# Patient Record
Sex: MALE | Race: BLACK OR AFRICAN AMERICAN | NOT HISPANIC OR LATINO | Employment: FULL TIME | URBAN - METROPOLITAN AREA
[De-identification: names, ages, dates, MRNs, and addresses within clinical notes are randomized per-mention and may not be internally consistent; named-entity substitution may affect disease eponyms.]

---

## 2017-02-08 ENCOUNTER — OFFICE VISIT (OUTPATIENT)
Dept: FAMILY MEDICINE | Facility: CLINIC | Age: 22
End: 2017-02-08

## 2017-02-08 VITALS
DIASTOLIC BLOOD PRESSURE: 73 MMHG | BODY MASS INDEX: 38.49 KG/M2 | HEIGHT: 68 IN | WEIGHT: 254 LBS | HEART RATE: 76 BPM | SYSTOLIC BLOOD PRESSURE: 116 MMHG | TEMPERATURE: 97.5 F
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
## 2017-02-08 NOTE — PROGRESS NOTES
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  He called animal control and they verfieid that the dog was up to date on all shots. He has had some pain on his left thigh where the dog bite was, but no significant erythema, swelling, or drainage. No fevers/chills.    PMH, Medications and Allergies were reviewed and updated as needed.  UTD on tetanus shot.    ROS: No fevers/chills.        OBJECTIVE     Filed Vitals:    02/08/17 1637   BP: 116/73   Pulse: 76   Temp: 97.5  F (36.4  C)   TempSrc: Oral   Height: 5' 7.75\" (172.1 cm)   Weight: 254 lb (115.214 kg)     Body mass index is 38.9 kg/(m^2).    General :  healthy and alert, no distress  Musculoskeletal: Left thigh has a 1cm scab with some surrounding ecchymosis, but no erythema or drainage.  Non-tender. No increased warmth.  Skin:   no other lesions or rashes   Neurological:  normal gait, no gross defects  Psychiatric:  appropriate mood and affect                        ASSESSMENT AND PLAN     (W54.0XXA) Dog bite, initial encounter  (primary encounter diagnosis)   Comment: Left thigh.  No signs of infection on exam.  Dog was up to date on shots per patient verified by animal control.  UTD on tetanus shot as well.  No need for prophylactic abx this far out.  Plan: Provided bacitracin to keep on the area for the next 5 days, return if things worsen    RTC as needed for follow up or sooner if develops new or worsening symptoms.    Discussed with MD Regan Sanabria, DO " PGY2  [facility]

## 2017-02-08 NOTE — PATIENT INSTRUCTIONS
Thank you for coming to clinic today.  Please do not hesitate to call or return if you have any questions.    1) Use bacitracin on the area each time you change a bandaid for the next 5 days  2) Return if you noticed worsening redness or pain in the area    Sincerely,  Dr. Gates

## 2017-02-08 NOTE — PROGRESS NOTES
Preceptor attestation:  Patient seen and discussed with the resident.  Assessment and plan reviewed with resident and agreed upon.  Supervising physician: Gunjan Ortega MD  [facility]

## 2017-02-08 NOTE — MR AVS SNAPSHOT
After Visit Summary   2/8/2017    Winston Vogt    MRN: [id number]           Patient Information     Date Of Birth          1995        Visit Information        Provider Department      2/8/2017 4:30 PM Regan Gates,  [facility]        Today's Diagnoses     Dog bite, initial encounter    -  1       Care Instructions    Thank you for coming to clinic today.  Please do not hesitate to call or return if you have any questions.    1) Use bacitracin on the area each time you change a bandaid for the next 5 days  2) Return if you noticed worsening redness or pain in the area    Sincerely,  Dr. Gates          Follow-ups after your visit        Who to contact     Please call your clinic at [phone] to:    Ask questions about your health    Make or cancel appointments    Discuss your medicines    Learn about your test results    Speak to your doctor   If you have compliments or concerns about an experience at your clinic, or if you wish to file a complaint, please contact [facility] Patient Relations at [phone] or email us at [email or web address]         Additional Information About Your Visit        MyChart Information     [employer] gives you secure access to your electronic health record. If you see a primary care provider, you can also send messages to your care team and make appointments. If you have questions, please call your primary care clinic.  If you do not have a primary care provider, please call [phone] and they will assist you.      [employer] is an electronic gateway that provides easy, online access to your medical records. With [employer], you can request a clinic appointment, read your test results, renew a prescription or communicate with your care team.     To access your existing account, please contact your [facility] or call [phone] for assistance.        Care EveryWhere ID     This is your  "Care EveryWhere ID. This could be used by other organizations to access your [place] medical records  [id number]        Your Vitals Were     Pulse Temperature Height BMI (Body Mass Index)          76 97.5  F (36.4  C) (Oral) 5' 7.75\" (172.1 cm) 38.90 kg/m2         Blood Pressure from Last 3 Encounters:   02/08/17 116/73   10/27/16 118/76   06/08/16 124/82    Weight from Last 3 Encounters:   02/08/17 254 lb (115.214 kg)   10/27/16 251 lb 6.4 oz (114.034 kg)   06/08/16 255 lb 12.8 oz (116.03 kg)              Today, you had the following     No orders found for display       Primary Care Provider Office Phone # Fax #    Shelly Gordon [phone]       [address] [address]        Thank you!     Thank you for choosing [facility]  for your care. Our goal is always to provide you with excellent care. Hearing back from our patients is one way we can continue to improve our services. Please take a few minutes to complete the written survey that you may receive in the mail after your visit with us. Thank you!             Your Updated Medication List - Protect others around you: Learn how to safely use, store and throw away your medicines at www.disposemymeds.org.          This list is accurate as of: 2/8/17  4:50 PM.  Always use your most recent med list.                   Brand Name Dispense Instructions for use    albuterol 108 (90 BASE) MCG/ACT Inhaler    albuterol    1 Inhaler    Inhale 2 puffs into the lungs every 6 hours as needed for shortness of breath / dyspnea or wheezing       fluticasone 110 MCG/ACT Inhaler    FLOVENT HFA    1 Inhaler    Inhale 1 puff into the lungs 2 times daily       pseudoePHEDrine 30 MG tablet    SUDAFED    45 tablet    Take 2 tablets (60 mg) by mouth 3 times daily as needed for congestion         "

## 2017-02-27 ENCOUNTER — TELEPHONE (OUTPATIENT)
Dept: FAMILY MEDICINE | Facility: CLINIC | Age: 22
End: 2017-02-27

## 2017-03-01 NOTE — TELEPHONE ENCOUNTER
Refill denied.  Needs visit.  Oral ketoconazole is no longer prescribed due to concerns for liver problems.  Shelly Gordon MD

## 2017-03-08 DIAGNOSIS — J45.909 UNCOMPLICATED ASTHMA, UNSPECIFIED ASTHMA SEVERITY: ICD-10-CM

## 2017-03-08 RX ORDER — FLUTICASONE PROPIONATE 110 UG/1
1 AEROSOL, METERED RESPIRATORY (INHALATION) 2 TIMES DAILY
Qty: 1 INHALER | Refills: 0 | Status: SHIPPED | OUTPATIENT
Start: 2017-03-08 | End: 2017-04-12

## 2017-03-08 NOTE — TELEPHONE ENCOUNTER
ACT=12.   Flovent sent.   Advised to be seen in clinic to discuss asthma, within 3 days.   Offered to transfer call to appt line, pt declined. Said he will call back later.  /DIANA Nagel

## 2017-03-09 ASSESSMENT — ASTHMA QUESTIONNAIRES: ACT_TOTALSCORE: 12

## 2017-03-21 ENCOUNTER — TELEPHONE (OUTPATIENT)
Dept: FAMILY MEDICINE | Facility: CLINIC | Age: 22
End: 2017-03-21

## 2017-03-21 NOTE — TELEPHONE ENCOUNTER
Pt's pharmacy is requesting a refill of Ketoconazole 200mg tablets, however it is no longer on the pt's med list.  Please advise.

## 2017-03-22 ENCOUNTER — TELEPHONE (OUTPATIENT)
Dept: FAMILY MEDICINE | Facility: CLINIC | Age: 22
End: 2017-03-22

## 2017-03-22 NOTE — TELEPHONE ENCOUNTER
See 2/27/17 documentation.  Already denied. Call and tell pharmacy please.  Shelly Gordon MD  Routed to [facility].

## 2017-03-23 NOTE — TELEPHONE ENCOUNTER
Contacted pt's pharmacy and gave them the message that the medication was stopped due to liver concerns.  They verbalized understanding and double checked that the pt was notified.  I assured the pharmacist that the pt was notified and she indicated the medication was declined in his chart.   Maegan Luna, [facility]

## 2017-04-12 DIAGNOSIS — J45.909 UNCOMPLICATED ASTHMA, UNSPECIFIED ASTHMA SEVERITY: ICD-10-CM

## 2017-04-12 RX ORDER — FLUTICASONE PROPIONATE 110 UG/1
1 AEROSOL, METERED RESPIRATORY (INHALATION) 2 TIMES DAILY
Qty: 1 INHALER | Refills: 11 | Status: SHIPPED | OUTPATIENT
Start: 2017-04-12 | End: 2017-05-09 | Stop reason: ALTCHOICE

## 2017-05-09 ENCOUNTER — OFFICE VISIT (OUTPATIENT)
Dept: FAMILY MEDICINE | Facility: CLINIC | Age: 22
End: 2017-05-09

## 2017-05-09 VITALS
TEMPERATURE: 98 F | DIASTOLIC BLOOD PRESSURE: 74 MMHG | WEIGHT: 257.8 LBS | HEART RATE: 75 BPM | HEIGHT: 68 IN | SYSTOLIC BLOOD PRESSURE: 115 MMHG | BODY MASS INDEX: 39.07 KG/M2

## 2017-05-09 DIAGNOSIS — J45.20 INTERMITTENT ASTHMA, UNCOMPLICATED: ICD-10-CM

## 2017-05-09 DIAGNOSIS — B35.4 TINEA CORPORIS: Primary | ICD-10-CM

## 2017-05-09 LAB
ALBUMIN SERPL BCP-MCNC: 3.9 G/DL (ref 3.5–5)
ALP SERPL-CCNC: 133 U/L (ref 45–120)
ALT SERPL W/O P-5'-P-CCNC: 36 U/L (ref 0–45)
ANION GAP SERPL CALCULATED.3IONS-SCNC: 12 MMOL/L (ref 5–18)
AST SERPL-CCNC: 23 U/L (ref 0–40)
BILIRUB SERPL-MCNC: 0.7 MG/DL (ref 0–1)
BUN SERPL-MCNC: 11 MG/DL (ref 8–22)
CALCIUM SERPL-MCNC: 9.5 MG/DL (ref 8.5–10.5)
CHLORIDE SERPL-SCNC: 105 MMOL/L (ref 98–107)
CO2 SERPL-SCNC: 22 MMOL/L (ref 22–31)
CREAT SERPL-MCNC: 0.84 MG/DL (ref 0.7–1.3)
GLUCOSE SERPL-MCNC: 110 MG/DL (ref 70–125)
HBA1C MFR BLD: 5.7 % (ref 4.1–5.7)
POTASSIUM SERPL-SCNC: 4 MMOL/L (ref 3.5–5)
PROT SERPL-MCNC: 7.4 G/DL (ref 6–8)
SODIUM SERPL-SCNC: 139 MMOL/L (ref 136–145)

## 2017-05-09 RX ORDER — ALBUTEROL SULFATE 90 UG/1
2 AEROSOL, METERED RESPIRATORY (INHALATION) EVERY 6 HOURS PRN
Qty: 1 INHALER | Refills: 6 | Status: SHIPPED | OUTPATIENT
Start: 2017-05-09 | End: 2017-08-08

## 2017-05-09 RX ORDER — TERBINAFINE HYDROCHLORIDE 250 MG/1
250 TABLET ORAL DAILY
Qty: 30 TABLET | Refills: 0 | Status: SHIPPED | OUTPATIENT
Start: 2017-05-09 | End: 2017-06-08

## 2017-05-09 NOTE — PROGRESS NOTES
Preceptor attestation:  Patient seen and discussed with the resident. Assessment and plan reviewed with resident and agreed upon.  Supervising physician: João Clark MD  [facility]

## 2017-05-09 NOTE — MR AVS SNAPSHOT
After Visit Summary   5/9/2017    Winston Vogt    MRN: [id number]           Patient Information     Date Of Birth          1995        Visit Information        Provider Department      5/9/2017 11:20 AM Myles Cowart MD [facility]        Today's Diagnoses     Tinea corporis    -  1    Intermittent asthma, uncomplicated [J45.20]          Care Instructions    Follow-up in 1 month . Take medication once a day to treat the fungal infection.     Bring a change of clothes to work in case your clothes gets damp.   Take Daily showers.   Wear loose fit clothing to prevent sweating.         Follow-ups after your visit        Who to contact     Please call your clinic at [phone] to:    Ask questions about your health    Make or cancel appointments    Discuss your medicines    Learn about your test results    Speak to your doctor   If you have compliments or concerns about an experience at your clinic, or if you wish to file a complaint, please contact [facility] Patient Relations at [phone] or email us at [email or web address]         Additional Information About Your Visit        MyChart Information     [employer] gives you secure access to your electronic health record. If you see a primary care provider, you can also send messages to your care team and make appointments. If you have questions, please call your primary care clinic.  If you do not have a primary care provider, please call [phone] and they will assist you.      [employer] is an electronic gateway that provides easy, online access to your medical records. With [employer], you can request a clinic appointment, read your test results, renew a prescription or communicate with your care team.     To access your existing account, please contact your [facility] or call [phone] for assistance.        Care EveryWhere ID     This is your Care EveryWhere ID. This  "could be used by other organizations to access your [place] medical records  [id number]        Your Vitals Were     Pulse Temperature Height BMI (Body Mass Index)          75 98  F (36.7  C) 5' 8\" (172.7 cm) 39.2 kg/m2         Blood Pressure from Last 3 Encounters:   05/09/17 115/74   02/08/17 116/73   10/27/16 118/76    Weight from Last 3 Encounters:   05/09/17 257 lb 12.8 oz (116.9 kg)   02/08/17 254 lb (115.2 kg)   10/27/16 251 lb 6.4 oz (114 kg)              We Performed the Following     Comprehensive Metabolic ([facility])     Hemoglobin A1c ([facility])          Today's Medication Changes          These changes are accurate as of: 5/9/17 12:10 PM.  If you have any questions, ask your nurse or doctor.               Start taking these medicines.        Dose/Directions    terbinafine 250 MG tablet   Commonly known as:  lamISIL   Used for:  Tinea corporis   Started by:  Myles Cowart MD        Dose:  250 mg   Take 1 tablet (250 mg) by mouth daily   Quantity:  30 tablet   Refills:  0         Stop taking these medicines if you haven't already. Please contact your care team if you have questions.     fluticasone 110 MCG/ACT Inhaler   Commonly known as:  FLOVENT HFA   Stopped by:  Myles Cowart MD                Where to get your medicines      These medications were sent to [facility]/pharmacy #[id number] - [place], [place] - [address]  [address], [place] [address]    Hours:  24-hours Phone:  [phone]     albuterol 108 (90 BASE) MCG/ACT Inhaler    terbinafine 250 MG tablet                Primary Care Provider Office Phone # Fax #    Shelly Gordon [phone]       [address] [address]        Thank you!     Thank you for choosing [facility]  for your care. Our goal is always to provide you with excellent care. Hearing back from our patients is one way we can continue to improve our services. Please take a few minutes to complete " the written survey that you may receive in the mail after your visit with us. Thank you!             Your Updated Medication List - Protect others around you: Learn how to safely use, store and throw away your medicines at www.disposemymeds.org.          This list is accurate as of: 5/9/17 12:10 PM.  Always use your most recent med list.                   Brand Name Dispense Instructions for use    albuterol 108 (90 BASE) MCG/ACT Inhaler    albuterol    1 Inhaler    Inhale 2 puffs into the lungs every 6 hours as needed for shortness of breath / dyspnea or wheezing       pseudoePHEDrine 30 MG tablet    SUDAFED    45 tablet    Take 2 tablets (60 mg) by mouth 3 times daily as needed for congestion       terbinafine 250 MG tablet    lamISIL    30 tablet    Take 1 tablet (250 mg) by mouth daily

## 2017-05-09 NOTE — LETTER
May 12, 2017      Winston Vogt  [address] [place] [address]        Dear Winston,    Please see below for your test results.  Diabetes screen was normal.     Resulted Orders   Hemoglobin A1c ([facility])   Result Value Ref Range    Hemoglobin A1C 5.7 4.1 - 5.7 %   Comprehensive Metabolic ([facility])   Result Value Ref Range    Sodium 139 136 - 145 mmol/L    Potassium 4.0 3.5 - 5.0 mmol/L    Chloride 105 98 - 107 mmol/L    CO2, Total 22 22 - 31 mmol/L    Anion Gap 12 5 - 18 mmol/L    Glucose 110 70 - 125 mg/dL    Urea Nitrogen 11 8 - 22 mg/dL    Creatinine 0.84 0.70 - 1.30 mg/dL    GFR Estimate If Black >60 >60 mL/min/1.73m2    GFR Estimate >60 >60 mL/min/1.73m2    Bilirubin Total 0.7 0.0 - 1.0 mg/dL    Calcium 9.5 8.5 - 10.5 mg/dL    Protein Total 7.4 6.0 - 8.0 g/dL    Albumin 3.9 3.5 - 5.0 g/dL    Alkaline Phosphatase 133 (H) 45 - 120 U/L    AST (SGOT) 23 0 - 40 U/L    ALT (SGPT) 36 0 - 45 U/L    Narrative    Test performed by:  [facility] [address] [place] [address]  Fasting Glucose reference range is 70-99 mg/dL per  American Diabetes Association (ADA) guidelines.       If you have any questions, please call the clinic to make an appointment.    Sincerely,    Myles Cowart MD

## 2017-05-09 NOTE — LETTER
My Asthma Action Plan  Name: Winston Vogt   YOB: 1995  Date: 5/9/2017   My doctor: Myles Cowart MD   My clinic: [facility]        My Control Medicine: none  My Rescue Medicine: Albuterol 2 puffs    My Asthma Severity: intermittent  Avoid your asthma triggers: Patient is unaware of triggers               GREEN ZONE     Good Control    I feel good    No cough or wheeze    Can work, sleep and play without asthma symptoms       Take your asthma control medicine every day.     1. If exercise triggers your asthma, take your rescue medication    15 minutes before exercise or sports, and    During exercise if you have asthma symptoms  2. Spacer to use with inhaler: If you have a spacer, make sure to use it with your inhaler             YELLOW ZONE     Getting Worse  I have ANY of these:    I do not feel good    Cough or wheeze    Chest feels tight    Wake up at night   1. Keep taking your Green Zone medications  2. Start taking your rescue medicine:    every 20 minutes for up to 1 hour. Then every 4 hours for 24-48 hours.  3. If you stay in the Yellow Zone for more than 12-24 hours, contact your doctor.  4. If you do not return to the Green Zone in 12-24 hours or you get worse, start taking your oral steroid medicine if prescribed by your provider.           RED ZONE     Medical Alert - Get Help  I have ANY of these:    I feel awful    Medicine is not helping    Breathing getting harder    Trouble walking or talking    Nose opens wide to breathe       1. Take your rescue medicine NOW  2. If your provider has prescribed an oral steroid medicine, start taking it NOW  3. Call your doctor NOW  4. If you are still in the Red Zone after 20 minutes and you have not reached your doctor:    Take your rescue medicine again and    Call 911 or go to the emergency room right away    See your regular doctor within 2 weeks of an Emergency Room or Urgent Care visit for follow-up treatment.        Electronically  signed by: Myles Cowart, May 9, 2017    Annual Reminders:  Meet with Asthma Educator,  Flu Shot in the Fall, consider Pneumonia Vaccination for patients with asthma (aged 19 and older).    Pharmacy: [facility]/PHARMACY #[id number] - [place], [place] - [address]                    Asthma Triggers  How To Control Things That Make Your Asthma Worse    Triggers are things that make your asthma worse.  Look at the list below to help you find your triggers and what you can do about them.  You can help prevent asthma flare-ups by staying away from your triggers.      Trigger                                                          What you can do   Cigarette Smoke  Tobacco smoke can make asthma worse. Do not allow smoking in your home, car or around you.  Be sure no one smokes at a child s day care or school.  If you smoke, ask your health care provider for ways to help you quit.  Ask family members to quit too.  Ask your health care provider for a referral to Quit Plan to help you quit smoking, or call 0-103-108-PLAN.     Colds, Flu, Bronchitis  These are common triggers of asthma. Wash your hands often.  Don t touch your eyes, nose or mouth.  Get a flu shot every year.     Dust Mites  These are tiny bugs that live in cloth or carpet. They are too small to see. Wash sheets and blankets in hot water every week.   Encase pillows and mattress in dust mite proof covers.  Avoid having carpet if you can. If you have carpet, vacuum weekly.   Use a dust mask and HEPA vacuum.   Pollen and Outdoor Mold  Some people are allergic to trees, grass, or weed pollen, or molds. Try to keep your windows closed.  Limit time out doors when pollen count is high.   Ask you health care provider about taking medicine during allergy season.     Animal Dander  Some people are allergic to skin flakes, urine or saliva from pets with fur or feathers. Keep pets with fur or feathers out of your home.    If you can t keep the pet outdoors, then keep  the pet out of your bedroom.  Keep the bedroom door closed.  Keep pets off cloth furniture and away from stuffed toys.     Mice, Rats, and Cockroaches  Some people are allergic to the waste from these pests.   Cover food and garbage.  Clean up spills and food crumbs.  Store grease in the refrigerator.   Keep food out of the bedroom.   Indoor Mold  This can be a trigger if your home has high moisture. Fix leaking faucets, pipes, or other sources of water.   Clean moldy surfaces.  Dehumidify basement if it is damp and smelly.   Smoke, Strong Odors, and Sprays  These can reduce air quality. Stay away from strong odors and sprays, such as perfume, powder, hair spray, paints, smoke incense, paint, cleaning products, candles and new carpet.   Exercise or Sports  Some people with asthma have this trigger. Be active!  Ask your doctor about taking medicine before sports or exercise to prevent symptoms.    Warm up for 5-10 minutes before and after sports or exercise.     Other Triggers of Asthma  Cold air:  Cover your nose and mouth with a scarf.  Sometimes laughing or crying can be a trigger.  Some medicines and food can trigger asthma.

## 2017-05-09 NOTE — PROGRESS NOTES
"      HPI:       Winston Vogt is a 22 year old who presents for the following  Patient presents with:  Asthma: followup  Eczema: recheck, came back    #Asthma  Most recent AAP done in 06/2016. At his visit approximately 11 months ago patient endorsed that he had difficulty with compliance of using inhaled fluticasone HFA daily. At that time he was using the inhaler approximately 2-3x/week, a lot of which he attributed to exercise. ACT score was 21 at that time.   Today,     For exercise: he doesn't do heavy strenous stuff. Cycling on the occasion for activity. Doesn't get short of breath like he used to as he would previously take inhaler before exercise.     ACT Total Scores 10/27/2016 3/8/2017 5/9/2017   ACT TOTAL SCORE - - -   ASTHMA ER VISITS - - -   ASTHMA HOSPITALIZATIONS - - -   ACT TOTAL SCORE (Goal Greater than or Equal to 20) 18 12 22   In the past 12 months, how many times did you visit the emergency room for your asthma without being admitted to the hospital? 0 0 0   In the past 12 months, how many times were you hospitalized overnight because of your asthma? 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 Took oral pill in past which mostly resolved infection, but it returned several months later. Rash is Currently affecting front and side of necks, chest, and some lower back on flank. Non-itchy in nature.     #Vaccination  Defers HPV testing today as he would like to discuss with those close to him regarding it.     Problem, Medication and Allergy Lists were reviewed and are current.  Patient is an established patient of this clinic.         Review of Systems:   Review of SystemsAs above per HPI          Physical Exam:     Patient Vitals for the past 24 hrs:   BP Temp Pulse Height Weight   05/09/17 1135 115/74 98  F (36.7  C) 75 5' 8\" (172.7 cm) 257 lb 12.8 oz (116.9 kg)     Body mass index is 39.2 kg/(m^2).  Vitals were reviewed and were normal     Physical " Exam  PULM: clear bilaterally without wheezes or rhonchi  SKIN: fungal infection that is non-pruritic of the lateral and anterior neck, upper chest, and in flank bilaterally  GEN: NAD, AAox3, appears stated age  CV: RRR no m/r/g, s1 and s2 noted  HEENT: EOMI, head normocephalic, sclera anicteric, trachea midline  ABD: soft, non-tender, + BS in all quadrants  NEURO: GCS 15  GAIT: normal  PSYCH: euthymic, linear thoughts, no delusions/hallucinations, comprehensible        Results:   A1c and CMP pending  Assessment and Plan     1. Intermittent asthma, uncomplicated [J45.20]  Discontinued flovent inhaler  Asthma Action plan given  - albuterol (ALBUTEROL) 108 (90 BASE) MCG/ACT Inhaler; Inhale 2 puffs into the lungs every 6 hours as needed for[   shortness of breath / dyspnea or wheezing  Dispense: 1 Inhaler; Refill: 6    2. Tinea corporis  30 day trial of oral terbinafine, follow-up in 1 month and consider extending therapy or increasing dose.   Recommended loose fit clothing, change of clothes if they get damp while at work. Daily showers.   - terbinafine (LAMISIL) 250 MG tablet; Take 1 tablet (250 mg) by mouth daily  Dispense: 30 tablet; Refill: 0  - Hemoglobin A1c ([facility])  - Comprehensive Metabolic ([facility])  There are no discontinued medications.  Options for treatment and follow-up care were reviewed with the patient. Winston Vogt  engaged in the decision making process and verbalized understanding of the options discussed and agreed with the final plan.    F/U appt: tinea corporis, offer HPV shot #1      Patient was precepted with Dr. Clark who agrees with the above assessment and plan    Myles Cowart MD PGY2  [facility]  [phone]

## 2017-05-09 NOTE — PATIENT INSTRUCTIONS
Follow-up in 1 month . Take medication once a day to treat the fungal infection.     Bring a change of clothes to work in case your clothes gets damp.   Take Daily showers.   Wear loose fit clothing to prevent sweating.

## 2017-05-10 ASSESSMENT — ASTHMA QUESTIONNAIRES: ACT_TOTALSCORE: 22

## 2017-08-08 DIAGNOSIS — J45.20 INTERMITTENT ASTHMA, UNCOMPLICATED: ICD-10-CM

## 2017-08-08 RX ORDER — ALBUTEROL SULFATE 90 UG/1
2 AEROSOL, METERED RESPIRATORY (INHALATION) EVERY 6 HOURS PRN
Qty: 1 INHALER | Refills: 12 | Status: SHIPPED | OUTPATIENT
Start: 2017-08-08 | End: 2017-10-02

## 2017-10-02 ENCOUNTER — OFFICE VISIT (OUTPATIENT)
Dept: FAMILY MEDICINE | Facility: CLINIC | Age: 22
End: 2017-10-02

## 2017-10-02 VITALS
OXYGEN SATURATION: 96 % | DIASTOLIC BLOOD PRESSURE: 70 MMHG | TEMPERATURE: 98.4 F | WEIGHT: 254.6 LBS | HEIGHT: 68 IN | HEART RATE: 73 BPM | SYSTOLIC BLOOD PRESSURE: 105 MMHG | BODY MASS INDEX: 38.58 KG/M2

## 2017-10-02 DIAGNOSIS — H72.91 OTITIS MEDIA OF RIGHT EAR WITH RUPTURE OF TYMPANIC MEMBRANE: ICD-10-CM

## 2017-10-02 DIAGNOSIS — B35.4 TINEA CORPORIS: ICD-10-CM

## 2017-10-02 DIAGNOSIS — Z23 NEED FOR VACCINATION: ICD-10-CM

## 2017-10-02 DIAGNOSIS — H66.91 OTITIS MEDIA OF RIGHT EAR WITH RUPTURE OF TYMPANIC MEMBRANE: ICD-10-CM

## 2017-10-02 DIAGNOSIS — J45.20 INTERMITTENT ASTHMA, UNCOMPLICATED: ICD-10-CM

## 2017-10-02 DIAGNOSIS — J45.30 MILD PERSISTENT ASTHMA WITHOUT COMPLICATION: Primary | ICD-10-CM

## 2017-10-02 RX ORDER — TERBINAFINE HYDROCHLORIDE 250 MG/1
250 TABLET ORAL DAILY
Qty: 30 TABLET | Refills: 0 | Status: SHIPPED | OUTPATIENT
Start: 2017-10-02 | End: 2017-11-01

## 2017-10-02 RX ORDER — FLUTICASONE PROPIONATE 44 UG/1
2 AEROSOL, METERED RESPIRATORY (INHALATION) 2 TIMES DAILY
Qty: 1 INHALER | Refills: 1 | Status: SHIPPED | OUTPATIENT
Start: 2017-10-02 | End: 2017-10-03

## 2017-10-02 RX ORDER — ALBUTEROL SULFATE 90 UG/1
2 AEROSOL, METERED RESPIRATORY (INHALATION) EVERY 6 HOURS PRN
Qty: 1 INHALER | Refills: 12 | Status: SHIPPED | OUTPATIENT
Start: 2017-10-02

## 2017-10-02 RX ORDER — OFLOXACIN 3 MG/ML
10 SOLUTION AURICULAR (OTIC) 2 TIMES DAILY
Qty: 14 ML | Refills: 0 | Status: SHIPPED | OUTPATIENT
Start: 2017-10-02 | End: 2017-10-16

## 2017-10-02 NOTE — NURSING NOTE
Winston Vogt      1.  Has the patient received the information for the influenza vaccine? YES    2.  Does the patient have any of the following contraindications?     Allergy to eggs?  No     Allergic reaction to previous influenza vaccines?  No     Any other problems to previous influenza vaccines?  No     Paralyzed by Guillain-[place] syndrome?  No     Currently pregnant? NO     Current moderate or severe illness?  No    Vaccination given by Rebecca Zhao MA.  Recorded by Rebecca Zhao

## 2017-10-02 NOTE — PROGRESS NOTES
"[facility] Clinic Visit    Subjective:  Winston Vogt is a 22 year old male with a PMHx significant for asthma, strabismus and tinea corporis who presents for follow up.     # Asthma: After a URI about 5 weeks ago, he reports his \"asthma got a lot worse\" for a subsequent 3 weeks. Around that time, he also cleaned out a very oneil room which is a known trigger for his asthma symptoms. He endorsed trouble breathing, \"non-stop\" wheezing and chest tightness since he wasn't able to to take a full deep breath. He used Albuterol every 3 hours over those 3 weeks. His ACT score is 13 today, because of those symptoms he had a couple weeks ago, but he has been largely asymptomatic for the past 2 weeks. Now, he reports trouble breathing for a couple minutes every morning and evening that resolves spontaneously. No wheezing or cough. He hasn't needed Albuterol since 3 weeks ago.     Of note, he was prescribed daily inhaled Fluticasone HFA last year but has struggled with daily adherence. His last dose was in July 2017. He reports it doesn't help his symptoms as much as the Albuterol so he chooses not to take it.     He has never gone to the ED/urgent care for an asthma attack. Never smoked tobacco or anything else. Never used alcohol or other illicit drugs. He has had minor reflux in the past but not for awhile. He doesn't snore nor have excess daytime sleepiness. He has seasonal allergies that presents with a runny nose which he doesn't have now.     # Tinea corporis, chronic: He continues to have a non-itchy, white, scaly and diffuse rash covering his neck, chest, epigastric area, medial side of his left arm and back. In the past, he took an oral pill which mostly resolved his rash but it recurred several months later. He was prescribed Terbinafine for one month in May 2017, but only took it infrequently for about 10 total days. He reports the rash is asymptomatic and has been stable in areas affected for awhile. " "He tried using OTC eczema cream which didn't improve his rash.     # Right ear pain and discharge: About one month ago, he felt a sharp, sudden pain in his right ear for about 30 seconds that spontaneously resolved. He doesn't recall any precipitating or related event. He was not inserting anything into his ear at the time. Since then he has felt like his ear is clogged or leaking, and is frequently cleaning it with q-tips. He occasionally sees green-mercedez tinged fluid though it's inconsistent. No fevers, chills or current pain or discharge. No loss of hearing.      Preventative care: Agreeable to flu shot today.    ROS:   Gen: No fevers, chills. Weight loss of about 6 lbs (intentional)   HEENT: No headache, vision changes, hearing loss. R lazy eye/strabismus stable  CV: No chest pain, palpitations, peripheral edema  Resp: No SOB, cough, wheezing, congestion, coryza  Skin: Rash stable    Objective:  Vitals:    10/02/17 1050   BP: 105/70   Pulse: 73   Temp: 98.4  F (36.9  C)   TempSrc: Oral   SpO2: 96%   Weight: 254 lb 9.6 oz (115.5 kg)   Height: 5' 7.5\" (171.5 cm)     Body mass index is 39.29 kg/(m^2).    GEN: NAD, healthy, alert  EYES: strabismus noted, EOMI, normal conjunctivae/sclerae  HENT: perforated right TM and spotty erythema on TM and ear canal without discharge or fluid; normal ear canal/TM on left, nose & mouth w/o ulcers or lesions, clear oropharynx, MMM  NECK: no LAD, asymmetry, masses, scars; thyroid normal to palpation  RESP: CTAB, no w/r/r  CV: RRR, nl S1/S2, no S3/S4, no m/r/g, no peripheral edema, peripheral pulses strong  MSK: no MSK defects noted, gait is age appropriate w/o ataxia  SKIN: diffuse, white, scaly, non-erythematous, flat, ring-shaped lesions circumferentially around neck, over entire chest and epigastric abdominal area, and medial side of left arm; patient reports rash extends into scalp but couldn't observe because of thick hair  PSYCH: mentation appears normal, affect " normal/bright    No results found for this or any previous visit (from the past 24 hour(s)).    Assessment/Plan:  Winston was seen today for derm problem, asthma and ear problem.    Diagnoses and all orders for this visit:    Mild persistent asthma without complication. His moderate-persistent symptoms after a viral URI about a month ago are consistent with an asthma exacerbation. He was only taking Albuterol frequently. We discussed the importance of adhering to the next step up in his asthma control plan which is the daily Flovent. He taught back the significance. Recommended follow up in 2 weeks or sooner if symptoms persist or worsen.  -     fluticasone (FLOVENT HFA) 44 MCG/ACT Inhaler; Inhale 2 puffs into the lungs 2 times daily  -     albuterol (PROAIR HFA) 108 (90 BASE) MCG/ACT Inhaler; Inhale 2 puffs into the lungs every 6 hours as needed for shortness of breath / dyspnea or wheezing    Otitis media of right ear with rupture of tympanic membrane. His ear pain and reports of green discharge with a clearly perforated right TM on exam are concerning for a otitis media. Prescribed Floxin to use, and to avoid significant water entry into ear canal. Will continue to monitor healing of TM for the next few months.   -     ofloxacin (FLOXIN) 0.3 % otic solution; Place 10 drops into the right ear 2 times daily for 14 days    Tinea corporis. Appears stable. Patient did not take Terbinafine consistently or long enough in May so we will try another month course again. Patient voiced understanding of adhering to regimen. Recommended clean hygiene habits, loose clothing and against using topical steroids since it could make it worse.   -     terbinafine (LAMISIL) 250 MG tablet; Take 1 tablet (250 mg) by mouth daily    Need for vaccination  -     ADMIN VACCINE, INITIAL  -     Flu vaccine, quad, with preservative, 0.5 ml    Options for treatment and follow-up care were reviewed with the patient who was engaged and actively  involved in the decision making process, verbalized understanding of the options discussed, and satisfied with the final plan.    Patient was staffed with supervising physician, Dr. Rendon.     Antelmo Harmon MD, PGY-1  [facility]

## 2017-10-02 NOTE — MR AVS SNAPSHOT
After Visit Summary   10/2/2017    Winston Vogt    MRN: [id number]           Patient Information     Date Of Birth          1995        Visit Information        Provider Department      10/2/2017 10:40 AM Antelmo Harmon MD [facility]        Today's Diagnoses     Preventative health care    -  1    Mild persistent asthma without complication        Intermittent asthma, uncomplicated [J45.20]        Otitis media of right ear with rupture of tympanic membrane        Tinea corporis          Care Instructions    - Take Albuterol as needed every 6 hours   - Take Fluticasone 2 puffs, twice a day, every day  - Take 10 antibiotic ear drops into right ear twice a day for 14 days  - Take Terbinafine 1 tablet daily for 30 days for skin rash          Follow-ups after your visit        Who to contact     Please call your clinic at [phone] to:    Ask questions about your health    Make or cancel appointments    Discuss your medicines    Learn about your test results    Speak to your doctor   If you have compliments or concerns about an experience at your clinic, or if you wish to file a complaint, please contact [facility] Patient Relations at [phone] or email us at [email or web address]         Additional Information About Your Visit        MyChart Information     SHERPANDIPITY gives you secure access to your electronic health record. If you see a primary care provider, you can also send messages to your care team and make appointments. If you have questions, please call your primary care clinic.  If you do not have a primary care provider, please call [phone] and they will assist you.      SHERPANDIPITY is an electronic gateway that provides easy, online access to your medical records. With SHERPANDIPITY, you can request a clinic appointment, read your test results, renew a prescription or communicate with your care team.     To access your existing account, please contact  "your [facility] or call [phone] for assistance.        Care EveryWhere ID     This is your Care EveryWhere ID. This could be used by other organizations to access your [place] medical records  [id number]        Your Vitals Were     Pulse Temperature Height Pulse Oximetry BMI (Body Mass Index)       73 98.4  F (36.9  C) (Oral) 5' 7.5\" (171.5 cm) 96% 39.29 kg/m2        Blood Pressure from Last 3 Encounters:   10/02/17 105/70   05/09/17 115/74   02/08/17 116/73    Weight from Last 3 Encounters:   10/02/17 254 lb 9.6 oz (115.5 kg)   05/09/17 257 lb 12.8 oz (116.9 kg)   02/08/17 254 lb (115.2 kg)              We Performed the Following     FLU VAC, SPLIT VIRUS IM > 3 YO (QUADRIVALENT) 33834          Today's Medication Changes          These changes are accurate as of: 10/2/17 11:50 AM.  If you have any questions, ask your nurse or doctor.               Start taking these medicines.        Dose/Directions    fluticasone 44 MCG/ACT Inhaler   Commonly known as:  FLOVENT HFA   Used for:  Mild persistent asthma without complication   Started by:  Antelmo Harmon MD        Dose:  2 puff   Inhale 2 puffs into the lungs 2 times daily   Quantity:  1 Inhaler   Refills:  1       ofloxacin 0.3 % otic solution   Commonly known as:  FLOXIN   Used for:  Otitis media of right ear with rupture of tympanic membrane   Started by:  Antelmo Harmon MD        Dose:  10 drop   Place 10 drops into the right ear 2 times daily for 14 days   Quantity:  14 mL   Refills:  0       terbinafine 250 MG tablet   Commonly known as:  lamISIL   Used for:  Tinea corporis   Started by:  Antelmo Harmon MD        Dose:  250 mg   Take 1 tablet (250 mg) by mouth daily   Quantity:  30 tablet   Refills:  0            Where to get your medicines      These medications were sent to [id number] - [place], [place] - [address] AT [facility] [address]  [address], [place] [address]     Phone:  [phone]     " albuterol 108 (90 BASE) MCG/ACT Inhaler    fluticasone 44 MCG/ACT Inhaler    ofloxacin 0.3 % otic solution    terbinafine 250 MG tablet                Primary Care Provider Office Phone # Fax #    Shelly Gordon [phone]       [address] [address]        Equal Access to Services     [facility]: Hadii aad ku hadasho Soomaali, waaxda luqadaha, qaybta kaalmada adeegyada, waxay idiin hayaan adeeg kharash laMigdaliaaan . So [facility] [phone].    ATENCIÓN: Si habla español, tiene a king disposición servicios gratuitos de asistencia lingüística. Llame al [phone].    We comply with applicable federal civil rights laws and [place] laws. We do not discriminate on the basis of race, color, national origin, age, disability, sex, sexual orientation, or gender identity.            Thank you!     Thank you for choosing [facility]  for your care. Our goal is always to provide you with excellent care. Hearing back from our patients is one way we can continue to improve our services. Please take a few minutes to complete the written survey that you may receive in the mail after your visit with us. Thank you!             Your Updated Medication List - Protect others around you: Learn how to safely use, store and throw away your medicines at www.disposemymeds.org.          This list is accurate as of: 10/2/17 11:50 AM.  Always use your most recent med list.                   Brand Name Dispense Instructions for use Diagnosis    albuterol 108 (90 BASE) MCG/ACT Inhaler    PROAIR HFA    1 Inhaler    Inhale 2 puffs into the lungs every 6 hours as needed for shortness of breath / dyspnea or wheezing    Intermittent asthma, uncomplicated       fluticasone 44 MCG/ACT Inhaler    FLOVENT HFA    1 Inhaler    Inhale 2 puffs into the lungs 2 times daily    Mild persistent asthma without complication       ofloxacin 0.3 % otic solution    FLOXIN    14 mL    Place 10 drops into the right  ear 2 times daily for 14 days    Otitis media of right ear with rupture of tympanic membrane       terbinafine 250 MG tablet    lamISIL    30 tablet    Take 1 tablet (250 mg) by mouth daily    Tinea corporis

## 2017-10-02 NOTE — PATIENT INSTRUCTIONS
- Take Albuterol as needed every 6 hours   - Take Fluticasone 2 puffs, twice a day, every day  - Take 10 antibiotic ear drops into right ear twice a day for 14 days  - Take Terbinafine 1 tablet daily for 30 days for skin rash

## 2017-10-02 NOTE — PROGRESS NOTES
Preceptor attestation:  Patient seen and discussed with the resident. Assessment and plan reviewed with resident and agreed upon.  Supervising physician: Celso Rendon  [facility]

## 2017-10-03 DIAGNOSIS — J45.30 MILD PERSISTENT ASTHMA WITHOUT COMPLICATION: Primary | ICD-10-CM

## 2017-10-03 NOTE — PROGRESS NOTES
Pharmacy Note    Received fax that Flovent HFA was no longer covered.  Called insurance & found out that Arnuity Ellipta was preferred/covered.  Sent Rx for Arnuity Ellipta 100mcg 1 puff BID with note to pharmacy to educate pt that this is new controller, and dosing change, and to educate on how to use the new device.    Joselyn Cabrera, Pharm.D.

## 2017-12-06 ENCOUNTER — TELEPHONE (OUTPATIENT)
Dept: FAMILY MEDICINE | Facility: CLINIC | Age: 22
End: 2017-12-06

## 2017-12-06 DIAGNOSIS — J45.30 MILD PERSISTENT ASTHMA WITHOUT COMPLICATION: ICD-10-CM

## 2017-12-06 NOTE — TELEPHONE ENCOUNTER
Pt insurance changed recently per [facility], Arnuity Ellipta is no longer covered. Flovent is covered. An existing rx is on file and pharmacist will use that and call pt to .  Routed to Dr. Gordon PCP, [facility]-/Shona,RN

## 2017-12-08 RX ORDER — FLUTICASONE PROPIONATE 44 UG/1
2 AEROSOL, METERED RESPIRATORY (INHALATION) 2 TIMES DAILY
Qty: 1 INHALER | Refills: 11 | Status: SHIPPED | OUTPATIENT
Start: 2017-12-08 | End: 2018-09-06

## 2017-12-12 ENCOUNTER — OFFICE VISIT (OUTPATIENT)
Dept: FAMILY MEDICINE | Facility: CLINIC | Age: 22
End: 2017-12-12

## 2017-12-12 VITALS
WEIGHT: 253 LBS | SYSTOLIC BLOOD PRESSURE: 122 MMHG | BODY MASS INDEX: 39.04 KG/M2 | DIASTOLIC BLOOD PRESSURE: 83 MMHG | HEART RATE: 90 BPM | TEMPERATURE: 98 F

## 2017-12-12 DIAGNOSIS — J45.30 MILD PERSISTENT ASTHMA WITHOUT COMPLICATION: ICD-10-CM

## 2017-12-12 DIAGNOSIS — B36.0 TINEA VERSICOLOR: Primary | ICD-10-CM

## 2017-12-12 RX ORDER — FLUCONAZOLE 150 MG/1
300 TABLET ORAL WEEKLY
Qty: 4 TABLET | Refills: 0 | Status: SHIPPED | OUTPATIENT
Start: 2017-12-12 | End: 2018-01-01

## 2017-12-12 RX ORDER — KETOCONAZOLE 20 MG/ML
SHAMPOO TOPICAL DAILY
Qty: 120 ML | Refills: 1 | Status: SHIPPED | OUTPATIENT
Start: 2017-12-12 | End: 2018-02-15

## 2017-12-12 NOTE — PROGRESS NOTES
"     SUBJECTIVE     Chief Complaint   Patient presents with     Eczema     Pt states he is here to discuss Eczema.     Asthma     Pt states he is here to discuss his asthma.      Medication Reconciliation     Complete.        Subjective: Winston Vogt is a 22 year old male with history of asthma here for inhaler concern and eczema.    Inhaler: Has had difficulty obtaining \"orange inhaler\" (fluticasone) per pharmacy/insurance issues issues. There is one at the pharmacy right now, but it looks different and not sure if he should take it. Wants the one he had previously. Blue inhaler inhaler (albuterol) works OK. Right now is not taking anything daily for the last 3-4 weeks - notes things are worse but manageable. ACT of 18 today. Per chart review, appears to have switched insurance recently with preferred inhaler now being fluticasone    \"eczema\" - got pills before from Dr. Harmon, completed course, and eczema went away completely, however notes it came back. Rash is not itchy or painful. Has had issues with this a long time. Rash covers neck, chest, back and extends to arms. Would like another course of pills. Per chart review, Dr. Harmon prescribed terbinafine for widespread tinea corporis. Patient also notes a patchy bald spot on the top of his head without any other receding hair issues.     ROS:    General: No fevers  Skin: As above  GI: No nausea, vomiting    PMH/PSH, FamHx, Meds, Allergies reviewed and updated as needed.    Social History     Social History     Marital status: Single     Spouse name: N/A     Number of children: N/A     Years of education: N/A     Social History Main Topics     Smoking status: Never Smoker     Smokeless tobacco: Never Used     Alcohol use No     Drug use: No     Sexual activity: No     Other Topics Concern     None     Social History Narrative           OBJECTIVE     /83 (BP Location: Left arm, Patient Position: Sitting, Cuff Size: Adult Large)  Pulse 90  Temp 98  F (36.7  C) " (Oral)  Wt 253 lb (114.8 kg)  BMI 39.04 kg/m2  Body mass index is 39.04 kg/(m^2).    Physical exam:  Gen: No acute distress  HEENT: Small patch of thinning/broken hair over central forehead with scaling of scalp without erythema underneath.   CV: Regular rate and rhythm, no murmurs/rubs/gallops  Skin: Hyperpigmented circular plaques (some with central clearing) and no erythema extending from neck, upper back and chest to lower back, abdomen, and upper arms. Lower arms have hypopigmented patches in similar shapes to the plaques mentioned above.   Psych: Mood and affect appropriate, mentation appears normal.    No results found for this or any previous visit (from the past 24 hour(s)).      ASSESSMENT AND PLAN     Winston Vogt is a 22 year old male here for skin check and asthma    1. Tinea versicolor  Rash today looks more consistent with tinea versicolor. Could likely be widespread tinea corporis as well given his good response to terbinafine. Could consider skin scraping in future if definitive diagnosis desired. May also have tinea vs. Seborrheic dermatitis contributing to patch of hair loss. Will try diflucan for the rash and ketoconazole shampoo for the scalp - will have him return to clinic if these do not work.   - ketoconazole (NIZORAL) 2 % shampoo; Apply topically daily  Dispense: 120 mL; Refill: 1  - fluconazole (DIFLUCAN) 150 MG tablet; Take 2 tablets (300 mg) by mouth once a week For 2 weeks  Dispense: 4 tablet; Refill: 0    2. Mild persistent asthma without complication  ACT 18 today. Will resume fluticasone, patient plans on following up with Dr. Harmon in near future once he has restarted his controller inhaler.       Trang Magana MD, PGY-2  I precepted today with João Clark MD.

## 2017-12-12 NOTE — MR AVS SNAPSHOT
After Visit Summary   12/12/2017    Winston Vogt    MRN: [id number]           Patient Information     Date Of Birth          1995        Visit Information        Provider Department      12/12/2017 10:20 AM Trang Magana MD [facility]        Today's Diagnoses     Tinea versicolor    -  1      Care Instructions    Shampoo daily until resolves (up to 5 days in a row)  Fluconazole 2 pills once a week for 2 weeks          Follow-ups after your visit        Who to contact     Please call your clinic at [phone] to:    Ask questions about your health    Make or cancel appointments    Discuss your medicines    Learn about your test results    Speak to your doctor   If you have compliments or concerns about an experience at your clinic, or if you wish to file a complaint, please contact [facility] Patient Relations at [phone] or email us at [email or web address]         Additional Information About Your Visit        MyChart Information     Firmafont gives you secure access to your electronic health record. If you see a primary care provider, you can also send messages to your care team and make appointments. If you have questions, please call your primary care clinic.  If you do not have a primary care provider, please call [phone] and they will assist you.      Qiandao is an electronic gateway that provides easy, online access to your medical records. With Qiandao, you can request a clinic appointment, read your test results, renew a prescription or communicate with your care team.     To access your existing account, please contact your [facility] or call [phone] for assistance.        Care EveryWhere ID     This is your Care EveryWhere ID. This could be used by other organizations to access your [place] medical records  [id number]        Your Vitals Were     Pulse Temperature BMI (Body Mass Index)              90 98  F (36.7  C) (Oral) 39.04 kg/m2          Blood Pressure from Last 3 Encounters:   12/12/17 122/83   10/02/17 105/70   05/09/17 115/74    Weight from Last 3 Encounters:   12/12/17 253 lb (114.8 kg)   10/02/17 254 lb 9.6 oz (115.5 kg)   05/09/17 257 lb 12.8 oz (116.9 kg)              Today, you had the following     No orders found for display         Today's Medication Changes          These changes are accurate as of: 12/12/17 11:10 AM.  If you have any questions, ask your nurse or doctor.               Start taking these medicines.        Dose/Directions    fluconazole 150 MG tablet   Commonly known as:  DIFLUCAN   Used for:  Tinea versicolor   Started by:  Trang Magana MD        Dose:  300 mg   Take 2 tablets (300 mg) by mouth once a week For 2 weeks   Quantity:  4 tablet   Refills:  0       ketoconazole 2 % shampoo   Commonly known as:  NIZORAL   Used for:  Tinea versicolor   Started by:  Trang Magana MD        Apply topically daily   Quantity:  120 mL   Refills:  1            Where to get your medicines      These medications were sent to [id number] - [place], [place] - [address] [place] [address] AT [facility] [address]  [address], [place] [address]     Phone:  [phone]     fluconazole 150 MG tablet    ketoconazole 2 % shampoo                Primary Care Provider Office Phone # Fax #    Shelly Gordon [phone]       [address] [address]        Equal Access to Services     [facility]: Hadii marisol eaton Somykel, waaxda luqadaha, qaybta kaalmada estefania, pily mendez. So [facility] [phone].    ATENCIÓN: Si habla español, tiene a king disposición servicios gratuitos de asistencia lingüística. Llame al [phone].    We comply with applicable federal civil rights laws and [place] laws. We do not discriminate on the basis of race, color, national origin, age, disability, sex, sexual  orientation, or gender identity.            Thank you!     Thank you for choosing [facility]  for your care. Our goal is always to provide you with excellent care. Hearing back from our patients is one way we can continue to improve our services. Please take a few minutes to complete the written survey that you may receive in the mail after your visit with us. Thank you!             Your Updated Medication List - Protect others around you: Learn how to safely use, store and throw away your medicines at www.disposemymeds.org.          This list is accurate as of: 12/12/17 11:10 AM.  Always use your most recent med list.                   Brand Name Dispense Instructions for use Diagnosis    albuterol 108 (90 BASE) MCG/ACT Inhaler    PROAIR HFA    1 Inhaler    Inhale 2 puffs into the lungs every 6 hours as needed for shortness of breath / dyspnea or wheezing    Intermittent asthma, uncomplicated       fluconazole 150 MG tablet    DIFLUCAN    4 tablet    Take 2 tablets (300 mg) by mouth once a week For 2 weeks    Tinea versicolor       fluticasone 44 MCG/ACT Inhaler    FLOVENT HFA    1 Inhaler    Inhale 2 puffs into the lungs 2 times daily    Mild persistent asthma without complication       ketoconazole 2 % shampoo    NIZORAL    120 mL    Apply topically daily    Tinea versicolor

## 2017-12-12 NOTE — PROGRESS NOTES
Preceptor attestation:  Patient seen and discussed with the resident. Assessment and plan reviewed with resident and agreed upon.  Supervising physician: João Clark MD  [facility]

## 2017-12-14 ASSESSMENT — ASTHMA QUESTIONNAIRES: ACT_TOTALSCORE: 18

## 2017-12-28 ENCOUNTER — OFFICE VISIT (OUTPATIENT)
Dept: FAMILY MEDICINE | Facility: CLINIC | Age: 22
End: 2017-12-28
Payer: MEDICAID

## 2017-12-28 VITALS
HEART RATE: 61 BPM | TEMPERATURE: 97.8 F | WEIGHT: 249.8 LBS | SYSTOLIC BLOOD PRESSURE: 98 MMHG | DIASTOLIC BLOOD PRESSURE: 65 MMHG | BODY MASS INDEX: 38.55 KG/M2 | OXYGEN SATURATION: 98 %

## 2017-12-28 DIAGNOSIS — J45.30 MILD PERSISTENT ASTHMA WITHOUT COMPLICATION: Primary | ICD-10-CM

## 2017-12-28 DIAGNOSIS — H72.91 PERFORATED RIGHT TYMPANIC MEMBRANE ON EXAMINATION: ICD-10-CM

## 2017-12-28 DIAGNOSIS — B36.0 TINEA VERSICOLOR: ICD-10-CM

## 2017-12-28 NOTE — MR AVS SNAPSHOT
After Visit Summary   12/28/2017    Winston Vogt    MRN: [id number]           Patient Information     Date Of Birth          1995        Visit Information        Provider Department      12/28/2017 4:30 PM Antelmo Harmon MD [facility]        Today's Diagnoses     Mild persistent asthma without complication    -  1    Tinea versicolor          Care Instructions    - Continue ketoconazole shampoo, keep up good hygiene  - Great job with asthma control  - 10 ear drops, twice a day, for 14 days  - Return to clinic if any concerns or questions          Follow-ups after your visit        Who to contact     Please call your clinic at [phone] to:    Ask questions about your health    Make or cancel appointments    Discuss your medicines    Learn about your test results    Speak to your doctor   If you have compliments or concerns about an experience at your clinic, or if you wish to file a complaint, please contact [facility] Patient Relations at [phone] or email us at [email or web address]         Additional Information About Your Visit        [employer] Information     [employer] gives you secure access to your electronic health record. If you see a primary care provider, you can also send messages to your care team and make appointments. If you have questions, please call your primary care clinic.  If you do not have a primary care provider, please call [phone] and they will assist you.      [employer] is an electronic gateway that provides easy, online access to your medical records. With [employer], you can request a clinic appointment, read your test results, renew a prescription or communicate with your care team.     To access your existing account, please contact your [facility] or call [phone] for assistance.        Care EveryWhere ID     This is your Care EveryWhere ID. This could be used by other organizations to access  your [place] medical records  [id number]        Your Vitals Were     Pulse Temperature Pulse Oximetry BMI (Body Mass Index)          61 97.8  F (36.6  C) (Oral) 98% 38.55 kg/m2         Blood Pressure from Last 3 Encounters:   12/28/17 98/65   12/12/17 122/83   10/02/17 105/70    Weight from Last 3 Encounters:   12/28/17 249 lb 12.8 oz (113.3 kg)   12/12/17 253 lb (114.8 kg)   10/02/17 254 lb 9.6 oz (115.5 kg)              Today, you had the following     No orders found for display       Primary Care Provider Office Phone # Fax #    Shelly Gordon [phone]       [address] [address]        Equal Access to Services     LENORE SMITH : Hadii aad ku hadasho Somykel, waaxda luqadaha, qaybta kaalmada adehumbertoyascooter, pily luna . So [facility] [phone].    ATENCIÓN: Si habla español, tiene a king disposición servicios gratuitos de asistencia lingüística. Mirian al [phone].    We comply with applicable federal civil rights laws and [place] laws. We do not discriminate on the basis of race, color, national origin, age, disability, sex, sexual orientation, or gender identity.            Thank you!     Thank you for choosing [facility]  for your care. Our goal is always to provide you with excellent care. Hearing back from our patients is one way we can continue to improve our services. Please take a few minutes to complete the written survey that you may receive in the mail after your visit with us. Thank you!             Your Updated Medication List - Protect others around you: Learn how to safely use, store and throw away your medicines at www.disposemymeds.org.          This list is accurate as of: 12/28/17  5:14 PM.  Always use your most recent med list.                   Brand Name Dispense Instructions for use Diagnosis    albuterol 108 (90 BASE) MCG/ACT Inhaler    PROAIR HFA    1 Inhaler    Inhale 2 puffs into the lungs every 6  hours as needed for shortness of breath / dyspnea or wheezing    Intermittent asthma, uncomplicated       fluconazole 150 MG tablet    DIFLUCAN    4 tablet    Take 2 tablets (300 mg) by mouth once a week For 2 weeks    Tinea versicolor       fluticasone 44 MCG/ACT Inhaler    FLOVENT HFA    1 Inhaler    Inhale 2 puffs into the lungs 2 times daily    Mild persistent asthma without complication       ketoconazole 2 % shampoo    NIZORAL    120 mL    Apply topically daily    Tinea versicolor

## 2017-12-28 NOTE — PROGRESS NOTES
[facility] Visit    Subjective:  Winston Vogt is a 22 year old male with a PMHx significant for   Patient Active Problem List   Diagnosis     Tinea versicolor     Non morbid obesity     Strabismus     Mild persistent asthma without complication    who presents for follow up of asthma and tinea versicolor.     Asthma: Patient is pleased with significant symptom improvement over the past month. Today, ACT is 20. Not using albuterol inhaler at all anymore. Excellent adherence to daily controller use. No significant symptoms or attacks in a month.     Rash: Patient has struggled to control chronic tinea versicolor that occurs in his scalp, neck and across his chest and shoulders. However, after a month of terbinafine in November, rash almost completely resolved. Then it returned abruptly in early December, and he was treated with diflucan for 2 weeks and given ketoconazole shampoo. Finished Diflucan, with complete resolution of his rash again. He's wondering if there is further treatment indicated.     Right ear discharge: He has had several months of occasional right ear pain and popping sounds. Yawning aggravates it, and he reports seeing green discharge sometimes though it resolves spontaneously. A few months ago, I prescribed him Floxin drops for an ear infection, but he didn't use it as prescribed. No fevers, chills, congestion.     Preventative care: UTD.     ROS:   Gen: No fevers, chills, weight loss  HEENT: No headache, vision changes, hearing loss, swallowing problems   CV: No chest pain, palpitations, peripheral edema  Resp: No SOB, cough, wheezing, congestion, coryza  GI: No constipation, diarrhea, nausea, vomiting, heartburn, change in bowel habits  : No dysuria, hematuria, discharge  MSK: No arthralgias, myalgias  Skin: no new rash    Objective:  Vitals:    12/28/17 1642   BP: 98/65   Pulse: 61   Temp: 97.8  F (36.6  C)   TempSrc: Oral   SpO2: 98%   Weight: 249 lb 12.8 oz (113.3 kg)      Body mass index is 38.55 kg/(m^2).    GEN: NAD, healthy, alert  EYES: grossly normal to inspection, PERRL, EOMI, normal conjunctivae/sclerae  HENT: normal ear canals, perforated right TM w/o signs of erythema/discharge/purulence, nose & mouth w/o ulcers or lesions, clear oropharynx, MMM  NECK: no LAD, asymmetry, masses, scars; thyroid normal to palpation  RESP: CTAB, no w/r/r  CV: RRR, nl S1/S2, no S3/S4, no m/r/g, no peripheral edema, peripheral pulses strong  MSK: no MSK defects noted, gait is age appropriate w/o ataxia  SKIN: faint residual skin changes from chronic tinea rash on chest, complete resolution and normal appearing skin on scalp and neck  PSYCH: mentation appears normal, affect normal/bright    Assessment/Plan:  Winston was seen today for recheck and recheck.    Diagnoses and all orders for this visit:    Mild persistent asthma without complication. Continue current management plan. Impressive improvement and adherence to daily meds.   - Flovent BID, Albuterol PRN    Tinea versicolor. Excellent improvement and response to medication therapies, and patient has done very well with hygiene and lifestyle changes. Informed patient that rash could appear again spontaneously but no further interventions are indicated now.  - Continue ketoconazole shampoo as directed    Perforated right tympanic membrane on examination. No signs of acute infection on exam, but history of green discharge is concerning for smoldering chronic infection.   - Use previously prescribed Floxin drops, 10 drops BID, for the next 2 weeks    Options for treatment and follow-up care were reviewed with the patient who was engaged and actively involved in the decision making process, verbalized understanding of the options discussed, and satisfied with the final plan.    Patient was staffed with supervising physician, Dr. Ng.     Antelmo Harmon MD, PGY-1  [facility]

## 2017-12-28 NOTE — PROGRESS NOTES
Preceptor attestation:  Vital signs reviewed: BP 98/65  Pulse 61  Temp 97.8  F (36.6  C) (Oral)  Wt 249 lb 12.8 oz (113.3 kg)  SpO2 98%  BMI 38.55 kg/m2    Patient seen and discussed with the resident. Assessment and plan reviewed with resident and agreed upon.    Supervising physician: Poly Ng MD  [facility]

## 2018-02-15 ENCOUNTER — OFFICE VISIT (OUTPATIENT)
Dept: FAMILY MEDICINE | Facility: CLINIC | Age: 23
End: 2018-02-15

## 2018-02-15 VITALS
BODY MASS INDEX: 38.19 KG/M2 | WEIGHT: 252 LBS | DIASTOLIC BLOOD PRESSURE: 77 MMHG | OXYGEN SATURATION: 93 % | TEMPERATURE: 97.9 F | HEIGHT: 68 IN | SYSTOLIC BLOOD PRESSURE: 119 MMHG | HEART RATE: 86 BPM

## 2018-02-15 DIAGNOSIS — B36.0 TINEA VERSICOLOR: ICD-10-CM

## 2018-02-15 DIAGNOSIS — H50.9 STRABISMUS: ICD-10-CM

## 2018-02-15 DIAGNOSIS — H72.91 CHRONIC OTITIS MEDIA OF RIGHT EAR WITH PERFORATED TYMPANIC MEMBRANE: Primary | ICD-10-CM

## 2018-02-15 DIAGNOSIS — H66.91 CHRONIC OTITIS MEDIA OF RIGHT EAR WITH PERFORATED TYMPANIC MEMBRANE: Primary | ICD-10-CM

## 2018-02-15 RX ORDER — KETOCONAZOLE 20 MG/ML
SHAMPOO TOPICAL DAILY
Qty: 120 ML | Refills: 1 | Status: SHIPPED | OUTPATIENT
Start: 2018-02-15 | End: 2018-09-06

## 2018-02-15 RX ORDER — OFLOXACIN 3 MG/ML
10 SOLUTION AURICULAR (OTIC) 2 TIMES DAILY
Qty: 10 ML | Refills: 0 | Status: SHIPPED | OUTPATIENT
Start: 2018-02-15 | End: 2018-03-12

## 2018-02-15 ASSESSMENT — ASTHMA QUESTIONNAIRES
QUESTION_1 LAST FOUR WEEKS HOW MUCH OF THE TIME DID YOUR ASTHMA KEEP YOU FROM GETTING AS MUCH DONE AT WORK, SCHOOL OR AT HOME: NONE OF THE TIME
QUESTION_4 LAST FOUR WEEKS HOW OFTEN HAVE YOU USED YOUR RESCUE INHALER OR NEBULIZER MEDICATION (SUCH AS ALBUTEROL): ONCE A WEEK OR LESS
ACT_TOTALSCORE: 21
QUESTION_3 LAST FOUR WEEKS HOW OFTEN DID YOUR ASTHMA SYMPTOMS (WHEEZING, COUGHING, SHORTNESS OF BREATH, CHEST TIGHTNESS OR PAIN) WAKE YOU UP AT NIGHT OR EARLIER THAN USUAL IN THE MORNING: ONCE OR TWICE
QUESTION_2 LAST FOUR WEEKS HOW OFTEN HAVE YOU HAD SHORTNESS OF BREATH: ONCE OR TWICE A WEEK
QUESTION_5 LAST FOUR WEEKS HOW WOULD YOU RATE YOUR ASTHMA CONTROL: WELL CONTROLLED

## 2018-02-15 NOTE — PROGRESS NOTES
Preceptor attestation:  Patient seen and discussed with the resident. Assessment and plan reviewed with resident and agreed upon.  Supervising physician: Jessee Kelly  [facility]

## 2018-02-15 NOTE — PROGRESS NOTES
"[facility] Visit    Subjective:  Winston Vogt is a 22 year old male with a PMHx significant for   Patient Active Problem List   Diagnosis     Tinea versicolor     Non morbid obesity     Strabismus     Mild persistent asthma without complication    who presents with right ear discharge and right eye deviation/blurry vision.     # Chronic right otitis media with perforated tympanic membrane: Patient complaining of right ear greenish discharge for the past week. Also having right ear fullness and trouble hearing clearly. Denies any fevers, chills, congestion, nasal discharge, sore throat, cough, wheezing, shortness of breath. Of note, he has had these same issues before beginning in 10/2017. I prescribed him Floxin drops which he didn't take. At a follow up visit in 12/2017, he had these same symptoms and used the Floxin drops as prescribed which resolved his symptoms until a week ago. He's requesting a refill today.     # Right eye strabismus and blurry vision: Patient first noticed his right eye drifts laterally in 2013. He was found to have 20/20 vision of L eye and 20/100 of R eye. He was referred to an ophthalmologist and given glasses which he chose to never wear. No eye pain or other significant symptoms at that time. He has done well since until just recently he reports more diplopia and blurry vision. Also having associated mild headaches. Admits that he should start wearing glasses again.       Objective:  Vitals:    02/15/18 1003   BP: 119/77   Pulse: 86   Temp: 97.9  F (36.6  C)   TempSrc: Oral   SpO2: 93%   Weight: 252 lb (114.3 kg)   Height: 5' 7.79\" (172.2 cm)     Body mass index is 38.55 kg/(m^2).    GEN: NAD, healthy, alert  EYES: PERRL, EOMI, normal conjunctivae/sclerae, right eye gaze drifts laterally   HENT: perforated right TM without erythema/drainage/purulence and otherwise normal ear canals and left TM, nose & mouth w/o ulcers or lesions, clear oropharynx, MMM  NECK: no LAD, " asymmetry, masses, scars; thyroid normal to palpation  RESP: CTAB, no w/r/r  CV: RRR, nl S1/S2, no S3/S4, no m/r/g, no peripheral edema  SKIN: chronic light skin pigmentation changes on chest c/w tinea     Assessment/Plan:  Winston was seen today for eye problem and ear problem.    Diagnoses and all orders for this visit:    Chronic otitis media of right ear with perforated tympanic membrane. Will treat similar to last time. On exam, perforated right TM appears same as 2 months ago. Discussed possible ENT referral for chronic issues and patient declines at this time.   -     ofloxacin (FLOXIN) 0.3 % otic solution; Place 10 drops into the right ear 2 times daily for 7 days    Strabismus. Patient will likely need a new prescription for glasses.   -     OPHTHALMOLOGY ADULT REFERRAL    Tinea versicolor. Refill requested.   -     ketoconazole (NIZORAL) 2 % shampoo; Apply topically daily    Options for treatment and follow-up care were reviewed with the patient who was engaged and actively involved in the decision making process, verbalized understanding of the options discussed, and satisfied with the final plan.    Patient was staffed with supervising physician, Dr. Jessee Kelly.     Antelmo Harmon MD, PGY-1  [facility]

## 2018-02-15 NOTE — PATIENT INSTRUCTIONS
- See eye doctor  - Try ear drops again for 1 week  - Ketoconazole shampoo refilled  - Return to clinic in 2-4 weeks. Hearing screen then.    OPTHALMOLOGY REFERRAL:  [facility]  [address] [address]  [phone]  Date:  Monday April 9, 2018  Time:  1:30 PM  Given to patient.  Vilma Schuster  2/15/18

## 2018-02-16 ASSESSMENT — ASTHMA QUESTIONNAIRES: ACT_TOTALSCORE: 21

## 2018-03-12 ENCOUNTER — OFFICE VISIT (OUTPATIENT)
Dept: FAMILY MEDICINE | Facility: CLINIC | Age: 23
End: 2018-03-12
Payer: COMMERCIAL

## 2018-03-12 VITALS
BODY MASS INDEX: 38.37 KG/M2 | HEIGHT: 68 IN | HEART RATE: 75 BPM | WEIGHT: 253.2 LBS | OXYGEN SATURATION: 99 % | DIASTOLIC BLOOD PRESSURE: 72 MMHG | TEMPERATURE: 98.3 F | SYSTOLIC BLOOD PRESSURE: 118 MMHG

## 2018-03-12 DIAGNOSIS — H66.91 CHRONIC OTITIS MEDIA OF RIGHT EAR WITH PERFORATED TYMPANIC MEMBRANE: Primary | ICD-10-CM

## 2018-03-12 DIAGNOSIS — H72.91 CHRONIC OTITIS MEDIA OF RIGHT EAR WITH PERFORATED TYMPANIC MEMBRANE: Primary | ICD-10-CM

## 2018-03-12 RX ORDER — OFLOXACIN 3 MG/ML
10 SOLUTION AURICULAR (OTIC) 2 TIMES DAILY
Qty: 10 ML | Refills: 0 | Status: SHIPPED | OUTPATIENT
Start: 2018-03-12 | End: 2018-09-06

## 2018-03-12 NOTE — PATIENT INSTRUCTIONS
Thank you for coming to clinic today.  Please do not hesitate to call or return if you have any questions.    - OK to continue ear drop, refill sent to pharmacy  - Schedule ENT referral (ear specialist)    Sincerely,  Dr. Gates      ENT REFERRAL:  Orders and demographics faxed to [facility] and patient will contact them for scheduling.  Gave patient phone number.  PH:   [phone]  FAX:  [phone]  Vilma Schuster  3/12/18

## 2018-03-12 NOTE — MR AVS SNAPSHOT
After Visit Summary   3/12/2018    Winston Vogt    MRN: [id number]           Patient Information     Date Of Birth          1995        Visit Information        Provider Department      3/12/2018 4:30 PM Regan Gates DO [facility]        Today's Diagnoses     Chronic otitis media of right ear with perforated tympanic membrane    -  1      Care Instructions    Thank you for coming to clinic today.  Please do not hesitate to call or return if you have any questions.    - OK to continue ear drop, refill sent to pharmacy  - Schedule ENT referral (ear specialist)    Sincerely,  Dr. Gates            Follow-ups after your visit        Additional Services     OTOLARYNGOLOGY REFERRAL       Patient to stop at the [facility]    Reason for Referral: Chronic R ear perforation/drainage     needed: No  Language: English    May leave message on voicemail: Yes    ([place] Only) Referral should be tracked (Yes/No)?                  Future tests that were ordered for you today     Open Future Orders        Priority Expected Expires Ordered    OTOLARYNGOLOGY REFERRAL Routine  4/12/2018 3/12/2018            Who to contact     Please call your clinic at [phone] to:    Ask questions about your health    Make or cancel appointments    Discuss your medicines    Learn about your test results    Speak to your doctor            Additional Information About Your Visit        [employer] Information     [employer] gives you secure access to your electronic health record. If you see a primary care provider, you can also send messages to your care team and make appointments. If you have questions, please call your primary care clinic.  If you do not have a primary care provider, please call [phone] and they will assist you.      [employer] is an electronic gateway that provides easy, online access to your medical records. With [employer], you can request a clinic appointment, read your test results, renew a  "prescription or communicate with your care team.     To access your existing account, please contact your [facility] or call [phone] for assistance.        Care EveryWhere ID     This is your Care EveryWhere ID. This could be used by other organizations to access your [place] medical records  [id number]        Your Vitals Were     Pulse Temperature Height Pulse Oximetry BMI (Body Mass Index)       75 98.3  F (36.8  C) (Oral) 5' 7.75\" (172.1 cm) 99% 38.78 kg/m2        Blood Pressure from Last 3 Encounters:   03/12/18 118/72   02/15/18 119/77   12/28/17 98/65    Weight from Last 3 Encounters:   03/12/18 253 lb 3.2 oz (114.9 kg)   02/15/18 252 lb (114.3 kg)   12/28/17 249 lb 12.8 oz (113.3 kg)                 Today's Medication Changes          These changes are accurate as of 3/12/18  5:02 PM.  If you have any questions, ask your nurse or doctor.               Start taking these medicines.        Dose/Directions    ofloxacin 0.3 % otic solution   Commonly known as:  FLOXIN   Used for:  Chronic otitis media of right ear with perforated tympanic membrane   Started by:  Regan Gates,         Dose:  10 drop   Place 10 drops into the right ear 2 times daily   Quantity:  10 mL   Refills:  0            Where to get your medicines      These medications were sent to [id number] - [place], [place] - [address] [place] [address] AT [facility] & [address]  [address], [place] [address]     Phone:  [phone]     ofloxacin 0.3 % otic solution                Primary Care Provider Office Phone # Fax #    Shelly Gordon [phone]       [facility] [address] [facility] [address]        Equal Access to Services     LENORE SMITH AH: Hadii marisol rahmano Somykel, waaxda luqadaha, qaybta kaalmada adehumbertoyada, pily mendez. So [facility] [phone].    ATENCIÓN: Si habla español, tiene a king disposición servicios gratuitos de " asistencia lingüística. Mirian al [phone].    We comply with applicable federal civil rights laws and [place] laws. We do not discriminate on the basis of race, color, national origin, age, disability, sex, sexual orientation, or gender identity.            Thank you!     Thank you for choosing [facility]  for your care. Our goal is always to provide you with excellent care. Hearing back from our patients is one way we can continue to improve our services. Please take a few minutes to complete the written survey that you may receive in the mail after your visit with us. Thank you!             Your Updated Medication List - Protect others around you: Learn how to safely use, store and throw away your medicines at www.disposemymeds.org.          This list is accurate as of 3/12/18  5:02 PM.  Always use your most recent med list.                   Brand Name Dispense Instructions for use Diagnosis    albuterol 108 (90 BASE) MCG/ACT Inhaler    PROAIR HFA    1 Inhaler    Inhale 2 puffs into the lungs every 6 hours as needed for shortness of breath / dyspnea or wheezing    Intermittent asthma, uncomplicated       fluticasone 44 MCG/ACT Inhaler    FLOVENT HFA    1 Inhaler    Inhale 2 puffs into the lungs 2 times daily    Mild persistent asthma without complication       ketoconazole 2 % shampoo    NIZORAL    120 mL    Apply topically daily    Tinea versicolor       ofloxacin 0.3 % otic solution    FLOXIN    10 mL    Place 10 drops into the right ear 2 times daily    Chronic otitis media of right ear with perforated tympanic membrane

## 2018-03-12 NOTE — PROGRESS NOTES
"       SUBJECTIVE       Winston Vogt is a 22 year old  male with a PMH significant for:     Patient Active Problem List   Diagnosis     Tinea versicolor     Non morbid obesity     Strabismus     Mild persistent asthma without complication     Patient presents with:  Otalgia: f/u ear pain, h/o ear pain as a child, sometimes hears bubbly noises during the day      He presents today mainly with right ear pain.  He does have a history of chronic otitis media with perforation involving the right ear that has now been present for at least 5 months.  He was given ofloxacin drops in the past that have been helpful.  He says the drainage has significantly improved on the drops, but he has had to use a few different courses of this.  His main symptom lately has been dull, achy pain in his right ear.  It seems to come and go for approximately 30 minutes at a time.  He thinks it is worse in the morning.  He does have some dental issues on the right side and is seeing a dentist for this later in the week.    PMH, Medications and Allergies were reviewed and updated as needed.    ROS: No nasal drainage, left ear pain, sore throat        OBJECTIVE     Vitals:    03/12/18 1646   BP: 118/72   BP Location: Left arm   Patient Position: Sitting   Cuff Size: Adult Large   Pulse: 75   Temp: 98.3  F (36.8  C)   TempSrc: Oral   SpO2: 99%   Weight: 253 lb 3.2 oz (114.9 kg)   Height: 5' 7.75\" (172.1 cm)     Body mass index is 38.78 kg/(m^2).    General :  healthy and alert, no distress  HEENT:  PERRL, MMM.  Normal Conjunctiva, normal TM on left, right side has a ~30% posterior perforation without active drainage/erythema, no nasal drainage, no pharyngeal erythema or tonsillar exudates.  Non-tender/non-enlarged ant cervical nodes.  Right lower posterior molar is cracked in half with visible pulp.  No significant abscess seen.  Skin:   no lesions or rashes   Neurological:  normal gait, no gross defects  Psychiatric:  appropriate mood and affect  "                     Hematological: normal cervical and supraclavicular lymph nodes    ASSESSMENT AND PLAN     (H66.91,  H72.91) Chronic otitis media of right ear with perforated tympanic membrane  (primary encounter diagnosis)  Comment: Discussed that he still has a large hole in his eardrum that is not improved in over 5 months.  I recommended ENT referral for formal hearing testing and consideration of further treatment to help this heal.  I gave him a refill for ofloxacin by request.  Discussed that his right ear pain could be due to the perforation or it could be referred pain from his cracked tooth.  He has follow-up with dental this week for this.  Plan: OTOLARYNGOLOGY REFERRAL, ofloxacin (FLOXIN) 0.3        % otic solution          RTC as needed for follow up of eardrum perforation or sooner if develops new or worsening symptoms.    Discussed with MD Regan Molina, DO PGY3  [facility]    This note was created with help of Dragon dictation system. Grammatical /typing errors are not intentional.

## 2018-03-15 NOTE — PROGRESS NOTES
Preceptor attestation:  Patient seen and discussed with the resident. Assessment and plan reviewed with resident and agreed upon.  Supervising physician: Shelly Gordon  [facility]

## 2018-03-22 ENCOUNTER — TRANSFERRED RECORDS (OUTPATIENT)
Dept: HEALTH INFORMATION MANAGEMENT | Facility: CLINIC | Age: 23
End: 2018-03-22

## 2018-04-04 ENCOUNTER — TRANSFERRED RECORDS (OUTPATIENT)
Dept: HEALTH INFORMATION MANAGEMENT | Facility: CLINIC | Age: 23
End: 2018-04-04

## 2018-05-04 ENCOUNTER — TELEPHONE (OUTPATIENT)
Dept: FAMILY MEDICINE | Facility: CLINIC | Age: 23
End: 2018-05-04

## 2018-05-04 ENCOUNTER — OFFICE VISIT (OUTPATIENT)
Dept: FAMILY MEDICINE | Facility: CLINIC | Age: 23
End: 2018-05-04
Payer: COMMERCIAL

## 2018-05-04 VITALS
DIASTOLIC BLOOD PRESSURE: 80 MMHG | WEIGHT: 257 LBS | HEIGHT: 68 IN | BODY MASS INDEX: 38.95 KG/M2 | SYSTOLIC BLOOD PRESSURE: 119 MMHG | OXYGEN SATURATION: 98 % | TEMPERATURE: 98.3 F | HEART RATE: 73 BPM

## 2018-05-04 DIAGNOSIS — Z01.818 PREOP GENERAL PHYSICAL EXAM: Primary | ICD-10-CM

## 2018-05-04 DIAGNOSIS — J30.2 ACUTE SEASONAL ALLERGIC RHINITIS, UNSPECIFIED TRIGGER: ICD-10-CM

## 2018-05-04 LAB — HEMOGLOBIN: 15.3 G/DL (ref 13.3–17.7)

## 2018-05-04 RX ORDER — FLUTICASONE PROPIONATE 50 MCG
1-2 SPRAY, SUSPENSION (ML) NASAL DAILY
Qty: 1 BOTTLE | Refills: 11 | Status: SHIPPED | OUTPATIENT
Start: 2018-05-04 | End: 2018-09-06

## 2018-05-04 RX ORDER — CETIRIZINE HYDROCHLORIDE 10 MG/1
10 TABLET ORAL EVERY EVENING
Qty: 30 TABLET | Refills: 1 | Status: SHIPPED | OUTPATIENT
Start: 2018-05-04 | End: 2018-09-06

## 2018-05-04 NOTE — PATIENT INSTRUCTIONS
Presurgery Checklist  You are scheduled to have surgery. The healthcare staff will try to make your stay comfortable. Use the guidelines below to remind yourself what to do before surgery. Be sure to follow any specific pre-op instructions from your surgeon or nurse.   Preparing for Surgery  Ask your surgeon if you ll need a blood transfusion during surgery and if so, how to prepare for it. In some cases, you can donate blood before surgery. If needed, this blood can be given back (transfused) to you during or after surgery.  If you are having abdominal surgery, ask what you need to do to clear your bowel.  Tell your surgeon if you have allergies to any medications or foods.  Arrange for an adult family member or friend to drive you home after surgery. If possible, have someone ready to help you at home as you recover.  Call the surgeon if you get a cold, fever, sore throat, diarrhea, or other health problem just before surgery. Your surgeon can decide whether or not to postpone the surgery.  Medications  Tell your surgeon about all medications you take, including prescription and over-the-counter products such as herbal remedies and vitamins. Ask if you should continue taking them.  If you take ibuprofen, naproxen, or  blood thinners  such as aspirin, clopidogrel (Plavix), or warfarin (Coumadin), ask your surgeon whether you should stop taking them and how long before surgery you should stop.  You may be told to take antibiotics just before surgery to prevent infection. If so, follow instructions carefully on how to take them.  If you are told to take medications called anticoagulants to prevent blood clots after surgery, be sure to follow the instructions on how to take them.  Stop Smoking  If you smoke, healing may take longer. So at least 2 week(s) before surgery, stop smoking.  Bathing or Showering Before Surgery  If instructed, wash with antibacterial soap. Afterward, do not use lotions or powders.  If you  are having surgery on the head, you may be asked to shampoo with antibacterial soap. Follow instructions for doing so.  Do Not Remove Hair from the Surgery Site  Do not shave hair from the incision site, unless you are given specific instructions to do so. Usually, if hair needs to be removed, it will be done at the hospital right before surgery.  Don t Eat or Drink  Your doctor will tell you when to stop eating and drinking. If you do not follow your doctor's instructions, your procedure may be postponed or rescheduled for another day.  If your surgeon tells you to continue any medications, take them with small sips of water.  You can brush your teeth and rinse your mouth, but don t swallow any water.  Day of Surgery  Do not wear makeup. Do not use perfume, deodorant, or hairspray. Remove nail polish and artificial nails.  Leave jewelry (including rings), watches, and other valuables at home.  Be sure to bring health insurance cards or forms and a photo ID.  Bring a list of your medications (include the name, dose, how often you take them, and the time last dose was taken).  Arrive on time at the hospital or surgery facility.    Presurgery Checklist  You are scheduled to have surgery. The healthcare staff will try to make your stay comfortable. Use the guidelines below to remind yourself what to do before surgery. Be sure to follow any specific pre-op instructions from your surgeon or nurse.   Preparing for Surgery  Ask your surgeon if you ll need a blood transfusion during surgery and if so, how to prepare for it. In some cases, you can donate blood before surgery. If needed, this blood can be given back (transfused) to you during or after surgery.  If you are having abdominal surgery, ask what you need to do to clear your bowel.  Tell your surgeon if you have allergies to any medications or foods.  Arrange for an adult family member or friend to drive you home after surgery. If possible, have someone ready to  help you at home as you recover.  Call the surgeon if you get a cold, fever, sore throat, diarrhea, or other health problem just before surgery. Your surgeon can decide whether or not to postpone the surgery.  Medications  Tell your surgeon about all medications you take, including prescription and over-the-counter products such as herbal remedies and vitamins. Ask if you should continue taking them.  If you take ibuprofen, naproxen, or  blood thinners  such as aspirin, clopidogrel (Plavix), or warfarin (Coumadin), ask your surgeon whether you should stop taking them and how long before surgery you should stop.  You may be told to take antibiotics just before surgery to prevent infection. If so, follow instructions carefully on how to take them.  If you are told to take medications called anticoagulants to prevent blood clots after surgery, be sure to follow the instructions on how to take them.  Stop Smoking  If you smoke, healing may take longer. So at least 2 week(s) before surgery, stop smoking.  Bathing or Showering Before Surgery  If instructed, wash with antibacterial soap. Afterward, do not use lotions or powders.  If you are having surgery on the head, you may be asked to shampoo with antibacterial soap. Follow instructions for doing so.  Do Not Remove Hair from the Surgery Site  Do not shave hair from the incision site, unless you are given specific instructions to do so. Usually, if hair needs to be removed, it will be done at the hospital right before surgery.  Don t Eat or Drink  Your doctor will tell you when to stop eating and drinking. If you do not follow your doctor's instructions, your procedure may be postponed or rescheduled for another day.  If your surgeon tells you to continue any medications, take them with small sips of water.  You can brush your teeth and rinse your mouth, but don t swallow any water.  Day of Surgery  Do not wear makeup. Do not use perfume, deodorant, or hairspray. Remove  nail polish and artificial nails.  Leave jewelry (including rings), watches, and other valuables at home.  Be sure to bring health insurance cards or forms and a photo ID.  Bring a list of your medications (include the name, dose, how often you take them, and the time last dose was taken).  Arrive on time at the hospital or surgery facility.    Address: [address] [place], [place] [address] Phone: [phone]      Start flonase: 2 sprays in each nostril daily and take one 10 mg tablet of zyrtec a day for allergies

## 2018-05-04 NOTE — PROGRESS NOTES
Preceptor Attestation:   Patient seen, evaluated and discussed with the resident. I have verified the content of the note, which accurately reflects my assessment of the patient and the plan of care.   Supervising Physician:  Celso Rendon MD

## 2018-05-04 NOTE — MR AVS SNAPSHOT
After Visit Summary   5/4/2018    Winston Vogt    MRN: [id number]           Patient Information     Date Of Birth          1995        Visit Information        Provider Department      5/4/2018 9:40 AM Nikko Anthony DO [facility]        Today's Diagnoses     Preop general physical exam    -  1    Acute seasonal allergic rhinitis, unspecified trigger          Care Instructions      Presurgery Checklist  You are scheduled to have surgery. The healthcare staff will try to make your stay comfortable. Use the guidelines below to remind yourself what to do before surgery. Be sure to follow any specific pre-op instructions from your surgeon or nurse.   Preparing for Surgery  Ask your surgeon if you ll need a blood transfusion during surgery and if so, how to prepare for it. In some cases, you can donate blood before surgery. If needed, this blood can be given back (transfused) to you during or after surgery.  If you are having abdominal surgery, ask what you need to do to clear your bowel.  Tell your surgeon if you have allergies to any medications or foods.  Arrange for an adult family member or friend to drive you home after surgery. If possible, have someone ready to help you at home as you recover.  Call the surgeon if you get a cold, fever, sore throat, diarrhea, or other health problem just before surgery. Your surgeon can decide whether or not to postpone the surgery.  Medications  Tell your surgeon about all medications you take, including prescription and over-the-counter products such as herbal remedies and vitamins. Ask if you should continue taking them.  If you take ibuprofen, naproxen, or  blood thinners  such as aspirin, clopidogrel (Plavix), or warfarin (Coumadin), ask your surgeon whether you should stop taking them and how long before surgery you should stop.  You may be told to take antibiotics just before surgery to prevent infection. If so, follow instructions carefully  on how to take them.  If you are told to take medications called anticoagulants to prevent blood clots after surgery, be sure to follow the instructions on how to take them.  Stop Smoking  If you smoke, healing may take longer. So at least 2 week(s) before surgery, stop smoking.  Bathing or Showering Before Surgery  If instructed, wash with antibacterial soap. Afterward, do not use lotions or powders.  If you are having surgery on the head, you may be asked to shampoo with antibacterial soap. Follow instructions for doing so.  Do Not Remove Hair from the Surgery Site  Do not shave hair from the incision site, unless you are given specific instructions to do so. Usually, if hair needs to be removed, it will be done at the hospital right before surgery.  Don t Eat or Drink  Your doctor will tell you when to stop eating and drinking. If you do not follow your doctor's instructions, your procedure may be postponed or rescheduled for another day.  If your surgeon tells you to continue any medications, take them with small sips of water.  You can brush your teeth and rinse your mouth, but don t swallow any water.  Day of Surgery  Do not wear makeup. Do not use perfume, deodorant, or hairspray. Remove nail polish and artificial nails.  Leave jewelry (including rings), watches, and other valuables at home.  Be sure to bring health insurance cards or forms and a photo ID.  Bring a list of your medications (include the name, dose, how often you take them, and the time last dose was taken).  Arrive on time at the hospital or surgery facility.    Presurgery Checklist  You are scheduled to have surgery. The healthcare staff will try to make your stay comfortable. Use the guidelines below to remind yourself what to do before surgery. Be sure to follow any specific pre-op instructions from your surgeon or nurse.   Preparing for Surgery  Ask your surgeon if you ll need a blood transfusion during surgery and if so, how to prepare  for it. In some cases, you can donate blood before surgery. If needed, this blood can be given back (transfused) to you during or after surgery.  If you are having abdominal surgery, ask what you need to do to clear your bowel.  Tell your surgeon if you have allergies to any medications or foods.  Arrange for an adult family member or friend to drive you home after surgery. If possible, have someone ready to help you at home as you recover.  Call the surgeon if you get a cold, fever, sore throat, diarrhea, or other health problem just before surgery. Your surgeon can decide whether or not to postpone the surgery.  Medications  Tell your surgeon about all medications you take, including prescription and over-the-counter products such as herbal remedies and vitamins. Ask if you should continue taking them.  If you take ibuprofen, naproxen, or  blood thinners  such as aspirin, clopidogrel (Plavix), or warfarin (Coumadin), ask your surgeon whether you should stop taking them and how long before surgery you should stop.  You may be told to take antibiotics just before surgery to prevent infection. If so, follow instructions carefully on how to take them.  If you are told to take medications called anticoagulants to prevent blood clots after surgery, be sure to follow the instructions on how to take them.  Stop Smoking  If you smoke, healing may take longer. So at least 2 week(s) before surgery, stop smoking.  Bathing or Showering Before Surgery  If instructed, wash with antibacterial soap. Afterward, do not use lotions or powders.  If you are having surgery on the head, you may be asked to shampoo with antibacterial soap. Follow instructions for doing so.  Do Not Remove Hair from the Surgery Site  Do not shave hair from the incision site, unless you are given specific instructions to do so. Usually, if hair needs to be removed, it will be done at the hospital right before surgery.  Don t Eat or Drink  Your doctor will  tell you when to stop eating and drinking. If you do not follow your doctor's instructions, your procedure may be postponed or rescheduled for another day.  If your surgeon tells you to continue any medications, take them with small sips of water.  You can brush your teeth and rinse your mouth, but don t swallow any water.  Day of Surgery  Do not wear makeup. Do not use perfume, deodorant, or hairspray. Remove nail polish and artificial nails.  Leave jewelry (including rings), watches, and other valuables at home.  Be sure to bring health insurance cards or forms and a photo ID.  Bring a list of your medications (include the name, dose, how often you take them, and the time last dose was taken).  Arrive on time at the hospital or surgery facility.    Address: [address] [place], [place] [address] Phone: [phone]      Start flonase: 2 sprays in each nostril daily and take one 10 mg tablet of zyrtec a day for allergies          Follow-ups after your visit        Who to contact     Please call your clinic at [phone] to:    Ask questions about your health    Make or cancel appointments    Discuss your medicines    Learn about your test results    Speak to your doctor            Additional Information About Your Visit        MedWhat Information     MedWhat gives you secure access to your electronic health record. If you see a primary care provider, you can also send messages to your care team and make appointments. If you have questions, please call your primary care clinic.  If you do not have a primary care provider, please call [phone] and they will assist you.      MedWhat is an electronic gateway that provides easy, online access to your medical records. With MedWhat, you can request a clinic appointment, read your test results, renew a prescription or communicate with your care team.     To access your existing account, please contact your [facility] or call  "[phone] for assistance.        Care EveryWhere ID     This is your Care EveryWhere ID. This could be used by other organizations to access your [facility] medical records  [id number]        Your Vitals Were     Pulse Temperature Height Pulse Oximetry BMI (Body Mass Index)       73 98.3  F (36.8  C) (Oral) 5' 7.72\" (172 cm) 98% 39.4 kg/m2        Blood Pressure from Last 3 Encounters:   05/04/18 119/80   03/12/18 118/72   02/15/18 119/77    Weight from Last 3 Encounters:   05/04/18 257 lb (116.6 kg)   03/12/18 253 lb 3.2 oz (114.9 kg)   02/15/18 252 lb (114.3 kg)              We Performed the Following     Hemoglobin (HGB) ([facility])          Today's Medication Changes          These changes are accurate as of 5/4/18 11:08 AM.  If you have any questions, ask your nurse or doctor.               Start taking these medicines.        Dose/Directions    cetirizine 10 MG tablet   Commonly known as:  zyrTEC   Used for:  Acute seasonal allergic rhinitis, unspecified trigger   Started by:  Nikko Anthony DO        Dose:  10 mg   Take 1 tablet (10 mg) by mouth every evening   Quantity:  30 tablet   Refills:  1       fluticasone 50 MCG/ACT spray   Commonly known as:  FLONASE   Used for:  Acute seasonal allergic rhinitis, unspecified trigger   Started by:  Nikko Anthony DO        Dose:  1-2 spray   Spray 1-2 sprays into both nostrils daily   Quantity:  1 Bottle   Refills:  11            Where to get your medicines      These medications were sent to [id number] - [place], [place] - [address] AT [facility] [address]  [address], [place] [address]     Phone:  [phone]     cetirizine 10 MG tablet    fluticasone 50 MCG/ACT spray                Primary Care Provider Office Phone # Fax #    Shelly Gordon [phone]       [address] [place] [address]        Equal Access to Services     LENORE SMITH AH: barbara Riceadaenzo, qaybta " pily madrid haysienna fernándezhumberto mendez. Joavna [facility] [phone].    ATENCIÓN: Si nanci garcia, tiene a king disposición servicios gratuitos de asistencia lingüística. Mirian al [phone].    We comply with applicable federal civil rights laws and [place] laws. We do not discriminate on the basis of race, color, national origin, age, disability, sex, sexual orientation, or gender identity.            Thank you!     Thank you for choosing [facility]  for your care. Our goal is always to provide you with excellent care. Hearing back from our patients is one way we can continue to improve our services. Please take a few minutes to complete the written survey that you may receive in the mail after your visit with us. Thank you!             Your Updated Medication List - Protect others around you: Learn how to safely use, store and throw away your medicines at www.disposemymeds.org.          This list is accurate as of 5/4/18 11:08 AM.  Always use your most recent med list.                   Brand Name Dispense Instructions for use Diagnosis    albuterol 108 (90 Base) MCG/ACT Inhaler    PROAIR HFA    1 Inhaler    Inhale 2 puffs into the lungs every 6 hours as needed for shortness of breath / dyspnea or wheezing    Intermittent asthma, uncomplicated       cetirizine 10 MG tablet    zyrTEC    30 tablet    Take 1 tablet (10 mg) by mouth every evening    Acute seasonal allergic rhinitis, unspecified trigger       fluticasone 44 MCG/ACT Inhaler    FLOVENT HFA    1 Inhaler    Inhale 2 puffs into the lungs 2 times daily    Mild persistent asthma without complication       fluticasone 50 MCG/ACT spray    FLONASE    1 Bottle    Spray 1-2 sprays into both nostrils daily    Acute seasonal allergic rhinitis, unspecified trigger       ketoconazole 2 % shampoo    NIZORAL    120 mL    Apply topically daily    Tinea versicolor       ofloxacin 0.3 % otic solution    FLOXIN    10 mL    Place 10 drops into the  right ear 2 times daily    Chronic otitis media of right ear with perforated tympanic membrane

## 2018-05-04 NOTE — PROGRESS NOTES
[address] [address]  Phone: [phone]  Fax: [phone]    5/4/2018    Adult PRE-OP Evaluation:    Winston Vogt, 1995 presents for pre-operative evaluation and assessment as requested by [facility] prior to undergoing surgery/procedure for treatment of  Right eardrum perforation .    Proposed procedure: right ear drum repair    Date of Surgery/ Procedure: 5/7/18  Hospital/Surgical Facility: [facility], Fax: [phone]     Primary Physician: Shelly Gordon  Type of Anesthesia Anticipated: General  History of anesthesia complications: NONE  History of  abnormal bleeding: NONE   History of blood transfusions: NO  Patient has a Health Care Directive or Living Will:  NO    Preoperative Questions   1. NO - Do you have a history of heart attack, stroke, stent, bypass or surgery on an artery in the head, neck, heart or legs?  2. NO - Do you ever have any pain or discomfort in your chest?  3. NO - Have you ever had a severe pain across the front of your chest lasting for half an hour or more?  4. NO - Do you have a history of Congestive Heart Failure?  5. NO - Are you troubled by shortness of breath when: walking on the level/ up a slight hill/ at night?  6. NO - Does your chest ever sound wheezy or whistling?  7. Possible:  Do you currently have a cold, bronchitis or other respiratory infection? He has had sneezing, runny nose for a few days. In the last day has had a mostly dry cough. No throat pain, jsut a sensation of mucous there. No fevers, chills, night sweats. Has had seasonal allergies in the past. Using albuterol inhlaer very sparingly (sometimes once a day)  8.  See above- Have you had a cold, bronchitis or other respiratory infection within the last 2 weeks?  9. NO - Do you usually have a cough?  10. NO - Do you sometimes get pains in the calves of your legs when you walk?  11. NO - Do you or anyone in your family have previous history of blood  clots?  12. NO - Do you or does anyone in your family have a serious bleeding problem such as prolonged bleeding following surgeries or cuts?  13. NO - Have you ever had problems with anemia or been told to take iron pills?  14. NO - Have you had any abnormal blood loss such as black, tarry or bloody stools, or abnormal vaginal bleeding?  15. NO - Have you ever had a blood transfusion?  16. NO - Have you or any of your relatives ever had problems with anesthesia?  17. NO - Do you have sleep apnea, excessive snoring or daytime drowsiness?  18. NO - Do you have any prosthetic heart valves?  19. NO - Do you have prosthetic joints?  20. NO - Is there any chance that you may be pregnant?    Patient Active Problem List   Diagnosis     Tinea versicolor     Non morbid obesity     Strabismus     Mild persistent asthma without complication         Current Outpatient Prescriptions on File Prior to Visit:  albuterol (PROAIR HFA) 108 (90 BASE) MCG/ACT Inhaler Inhale 2 puffs into the lungs every 6 hours as needed for shortness of breath / dyspnea or wheezing   fluticasone (FLOVENT HFA) 44 MCG/ACT Inhaler Inhale 2 puffs into the lungs 2 times daily   ketoconazole (NIZORAL) 2 % shampoo Apply topically daily (Patient not taking: Reported on 5/4/2018)   ofloxacin (FLOXIN) 0.3 % otic solution Place 10 drops into the right ear 2 times daily (Patient not taking: Reported on 5/4/2018)     No current facility-administered medications on file prior to visit.     OTC products: None, except as noted above    Allergies   Allergen Reactions     Shrimp Rash     Latex Allergy: NO    Social History     Social History     Marital status: Single     Spouse name: N/A     Number of children: N/A     Years of education: N/A     Social History Main Topics     Smoking status: Never Smoker     Smokeless tobacco: Never Used     Alcohol use No     Drug use: No     Sexual activity: No     Other Topics Concern     None     Social History Narrative  "      REVIEW OF SYSTEMS:   Constitutional, HEENT, cardiovascular, pulmonary, gi and gu systems are negative, except as otherwise noted.    EXAM:     Patient Vitals for the past 24 hrs:   BP Temp Temp src Pulse SpO2 Height Weight   05/04/18 0954 119/80 98.3  F (36.8  C) Oral 73 98 % 5' 7.72\" (1.72 m) 257 lb (116.6 kg)     Body mass index is 39.4 kg/(m^2).  GENERAL: healthy, alert and no distress  EYES: Eyes grossly normal to inspection, extraocular movements - intact, and PERRL  HENT: right TM with perforation noted  Nose- swollen turbinates; Mouth- no ulcers, no lesions  NECK: no tenderness, no adenopathy, no asymmetry,  RESP: lungs clear to auscultation - no rales, no rhonchi, no wheezes  CV: regular rates and rhythm, normal S1 S2, no S3 or S4 and no murmur, no click or rub -  ABDOMEN: soft, no tenderness, no  hepatosplenomegaly, no masses, normal bowel sounds  MS: extremities- no gross deformities noted, no edema  SKIN: no suspicious lesions, no rashes  NEURO: strength and tone- normal, sensory exam- grossly normal, mentation- intact, speech- normal, reflexes- symmetric  BACK: no CVA tenderness, no paralumbar tenderness  PSYCH: Alert and oriented times 3; speech- coherent , normal rate and volume; able to articulate logical thoughts  LYMPHATICS: ant. cervical- normal, post. cervical- normal    DIAGNOSTICS:      Hemoglobin (indicated for history of anemia or procedure with significant blood loss such as tonsillectomy, major intraperitoneal surgery, vascular surgery, major spine surgery, total joint replacement)    RISK ASSESSMENT:     Cardiovascular Risk:  -Patient is able to do heavy housework without chest pain.  -The patient does not have chest pain at rest or with exertion  -Patient does not have a history of congestive heart failure.    -The patient does not have a history of stroke and does not have a history of valvular disease.    Pulmonary Risk:  -In terms of risk factors for pulmonary complication, the " patient has asthma    Perioperative Complications:  -The patient does not have a history of bleeding or clotting problems in the past.    -The patient has not had complications from surgeries.    -The patient does not have a family history of any anesthesia or surgical complications.      IMPRESSION:   Reason for surgery/procedure: right TM perforation    The proposed surgical procedure is considered LOW risk.    For above listed surgery and anesthesia:   Patient is at low risk for surgery/procedure and perioperative/procedure complications.    RECOMMENDATIONS:     Labs:  Hgb (normal at 15.3)    Fasting:  NPO for 12 hours prior to surgery    Preop Plan:  --Approval given to proceed with proposed procedure, without further diagnostic evaluation assuming the patient has improvement in his sneezing, dry cough over the weekend which are likely secondary to allergies. Less likely this is viral. CENTOR score of 0 and no throat pain so no need to check for strep. Treat with flonase, zyrtec    Medications:  Patient should take their regular medications the morning of surgery unless otherwise instructed.        Discussed with Dr. Matt, DO    Please contact our office if there are any further questions or information required about this patient.

## 2018-09-06 ENCOUNTER — OFFICE VISIT (OUTPATIENT)
Dept: FAMILY MEDICINE | Facility: CLINIC | Age: 23
End: 2018-09-06
Payer: COMMERCIAL

## 2018-09-06 VITALS
BODY MASS INDEX: 38.31 KG/M2 | SYSTOLIC BLOOD PRESSURE: 118 MMHG | OXYGEN SATURATION: 99 % | HEIGHT: 68 IN | TEMPERATURE: 98.5 F | WEIGHT: 252.8 LBS | HEART RATE: 70 BPM | RESPIRATION RATE: 20 BRPM | DIASTOLIC BLOOD PRESSURE: 75 MMHG

## 2018-09-06 DIAGNOSIS — J45.40 MODERATE PERSISTENT ASTHMA WITHOUT COMPLICATION: ICD-10-CM

## 2018-09-06 DIAGNOSIS — Z23 NEED FOR VACCINATION: Primary | ICD-10-CM

## 2018-09-06 DIAGNOSIS — J30.2 ACUTE SEASONAL ALLERGIC RHINITIS, UNSPECIFIED TRIGGER: ICD-10-CM

## 2018-09-06 DIAGNOSIS — B07.8 COMMON WART: ICD-10-CM

## 2018-09-06 PROBLEM — J45.30 MILD PERSISTENT ASTHMA WITHOUT COMPLICATION: Status: RESOLVED | Noted: 2017-10-02 | Resolved: 2018-09-06

## 2018-09-06 RX ORDER — FLUTICASONE PROPIONATE 50 MCG
1-2 SPRAY, SUSPENSION (ML) NASAL DAILY
Qty: 1 BOTTLE | Refills: 11 | Status: SHIPPED | OUTPATIENT
Start: 2018-09-06 | End: 2019-10-10

## 2018-09-06 RX ORDER — INHALER, ASSIST DEVICES
SPACER (EA) MISCELLANEOUS
Qty: 1 EACH | Refills: 0 | Status: SHIPPED | OUTPATIENT
Start: 2018-09-06 | End: 2019-08-06

## 2018-09-06 RX ORDER — FLUTICASONE PROPIONATE AND SALMETEROL XINAFOATE 230; 21 UG/1; UG/1
2 AEROSOL, METERED RESPIRATORY (INHALATION) 2 TIMES DAILY
Qty: 36 G | Refills: 11 | Status: SHIPPED | OUTPATIENT
Start: 2018-09-06 | End: 2018-09-10

## 2018-09-06 ASSESSMENT — PAIN SCALES - GENERAL: PAINLEVEL: NO PAIN (0)

## 2018-09-06 NOTE — PROGRESS NOTES
"Winston Vogt comes in today for the following concerns:  1.  He is here to follow-up on his asthma.  He has been taking his Flovent as directed.  He is on the lowest dose and he takes it twice a day.    Allergies, medications and problem list reviewed and updated as needed in Epic.      REVIEW OF SYSTEMS    General: No fevers  Head: No headache  Neck: No swallowing problems   CV: No chest pain or palpitations  Resp: No shortness of breath.  No cough. No hemoptysis.  GI: No constipation, or diarrhea.  No nausea or vomiting  : No urinary c/o    /75  Pulse 70  Temp 98.5  F (36.9  C) (Oral)  Resp 20  Ht 5' 7.84\" (1.723 m)  Wt 252 lb 12.8 oz (114.7 kg)  SpO2 99%  BMI 38.63 kg/m2      Gen:  Well nourished and in NAD  HEENT: PERRLA; TMs normal color and landmarks; nasopharynx pink and moist; oropharynx pink and moist  Neck: supple without lymphadenopathy  CV:  RRR  - no murmurs, rubs, or gallups,   Pulm:  CTAB, no wheezes/rales/rhonchi, good air entry   ABD: soft, nontender, no masses, no rebound, BS intact throughout  Extrem: no cyanosis, edema or clubbing  Skin: Wart on the thumb  Psych: Euthymic     ASSESSMENT and PLAN:  1. Need for vaccination    - TDAP VACCINE (BOOSTRIX)  - ADMIN VACCINE, INITIAL    2. Common wart    - Salicylic Acid 40 % PADS; Apply for 12 hours a day.  Replace every day.  Dispense: 36 each; Refill: 11    3. Acute seasonal allergic rhinitis, unspecified trigger    - fluticasone (FLONASE) 50 MCG/ACT spray; Spray 1-2 sprays into both nostrils daily  Dispense: 1 Bottle; Refill: 11    4. Moderate persistent asthma without complication  He has inadequate control of his asthma at this time.  We will switch him to Advair at the highest dose, twice a day, and have him follow-up in 2 months time.  If he is doing exceptionally well we could try going down to a slightly lower intensity.    - fluticasone-salmeterol (ADVAIR-HFA) 230-21 MCG/ACT inhaler; Inhale 2 puffs into the lungs 2 times daily  " Dispense: 36 g; Refill: 11  - Spacer/Aero-Holding Chambers (BREATHERITE ELMA SPACER ADULT) MISC; Use as directed  Dispense: 1 each; Refill: 0        Total of 30 minutes was spent in face to face contact with patient with > 50% in counseling and coordination of care.  Options for treatment and/or follow-up care were reviewed with the patient/family who was engaged and actively involved in the decision making process and who verbalized understanding of the options discussed and was satisfied with the final plan.    RTC in 2 months for follow up of asthma or sooner if develops new or worsening symptoms.    Isaías Gray

## 2018-09-06 NOTE — NURSING NOTE
Chief Complaint   Patient presents with     RECHECK     [facility]- ASTHMA CHECK      Joe Danielson, CMA

## 2018-09-06 NOTE — PATIENT INSTRUCTIONS
My Asthma Action Plan  Name: Winston Vogt  YOB: 1995  Date: 9/6/2018   My doctor: Shelly Gordon   My clinic:   [place] [address]  [phone]    My Asthma Severity: mild persistent Avoid your asthma triggers: smoke, exercise or sports and cold air      GREEN ZONE   Good Control    I feel good    No cough or wheeze    Can work, sleep and play without asthma symptoms       Take your asthma control medicine every day.  Take the medications listed below daily.    Advair 230/21 HFA 44 mcg 2 puffs twice a day    1. If exercise triggers your asthma, take your rescue medication (2 puffs of albuterol, Ventolin/Pro-Air) 15 minutes before exercise or sports, and during exercise if you have asthma symptoms.  2. Spacer to use with inhaler: If you have a spacer, make sure to use it with your inhaler.              YELLOW ZONE Getting Worse  I have ANY of these:    I do not feel good    Cough or wheeze    Chest feels tight    Wake up at night   1. Keep taking your Green Zone medications.  2. Start taking your rescue medicine (1-2 puffs of albuterol - Ventolin/Pro-Air) every 4-6 hours as needed.  3. If symptoms are not controlled with above, can take 2 puffs every 20 minutes for up to 1 hour, then continue every 4 hours if needed.   4. If you do not return to the Green Zone in 12-24 hours or you get worse, call the clinic.         RED ZONE Medical Alert - Get Help  I have ANY of these:    I feel awful    Medicine is not helping    Breathing getting harder    Trouble walking or talking    Nose opens wide to breathe       1. Take your rescue medicine NOW (6-8 puffs of albuterol - Ventolin/Pro-Air) for every 20 minutes for up to 1 hour.  2. Call your doctor NOW.  3. If you are still in the Red Zone after 20 minutes and you have not reached your doctor:    Take your rescue medicine again (6-8 puffs of albuterol - Ventolin/Pro-Air) and    Call 911 or go to the emergency room right  away    See your regular doctor within 1 weeks of an Emergency Room or Urgent Care visit for follow-up treatment.        This Asthma Action Plan provides authorization for the administration of medication described in the AAP.  YES      Electronically signed by: Isaías Gray    Annual Reminders:  Meet with Asthma Educator,  Flu Shot in the Fall, Pneumonia Shot  Pharmacy:    [facility]/PHARMACY #[id number] - [place], [place] - [address]  [id number] - [place], [place] - [address] [facility]

## 2018-09-06 NOTE — MR AVS SNAPSHOT
After Visit Summary   9/6/2018    Winston Vogt    MRN: [id number]           Patient Information     Date Of Birth          1995        Visit Information        Provider Department      9/6/2018 2:10 PM Isaías Gray MD [facility]        Today's Diagnoses     Need for vaccination    -  1    Common wart        Mild persistent asthma without complication        Acute seasonal allergic rhinitis, unspecified trigger          Care Instructions    My Asthma Action Plan  Name: Winston Vogt  YOB: 1995  Date: 9/6/2018   My doctor: Shelly Gordon   My clinic:   [address] [address]  [phone]    My Asthma Severity: mild persistent Avoid your asthma triggers: smoke, exercise or sports and cold air      GREEN ZONE   Good Control    I feel good    No cough or wheeze    Can work, sleep and play without asthma symptoms       Take your asthma control medicine every day.  Take the medications listed below daily.    Advair 230/21 HFA 44 mcg 2 puffs twice a day    1. If exercise triggers your asthma, take your rescue medication (2 puffs of albuterol, Ventolin/Pro-Air) 15 minutes before exercise or sports, and during exercise if you have asthma symptoms.  2. Spacer to use with inhaler: If you have a spacer, make sure to use it with your inhaler.              YELLOW ZONE Getting Worse  I have ANY of these:    I do not feel good    Cough or wheeze    Chest feels tight    Wake up at night   1. Keep taking your Green Zone medications.  2. Start taking your rescue medicine (1-2 puffs of albuterol - Ventolin/Pro-Air) every 4-6 hours as needed.  3. If symptoms are not controlled with above, can take 2 puffs every 20 minutes for up to 1 hour, then continue every 4 hours if needed.   4. If you do not return to the Green Zone in 12-24 hours or you get worse, call the clinic.         RED ZONE Medical Alert - Get Help  I have ANY of these:    I feel awful    Medicine is not  helping    Breathing getting harder    Trouble walking or talking    Nose opens wide to breathe       1. Take your rescue medicine NOW (6-8 puffs of albuterol - Ventolin/Pro-Air) for every 20 minutes for up to 1 hour.  2. Call your doctor NOW.  3. If you are still in the Red Zone after 20 minutes and you have not reached your doctor:    Take your rescue medicine again (6-8 puffs of albuterol - Ventolin/Pro-Air) and    Call 911 or go to the emergency room right away    See your regular doctor within 1 weeks of an Emergency Room or Urgent Care visit for follow-up treatment.        This Asthma Action Plan provides authorization for the administration of medication described in the AAP.  YES      Electronically signed by: Isaías Gray    Annual Reminders:  Meet with Asthma Educator,  Flu Shot in the Fall, Pneumonia Shot  Pharmacy:    [facility]/PHARMACY #[id number] - [place], [place] - [address] [facility] [id number] - [place], [place] - [address] [facility] [address]            Follow-ups after your visit        Who to contact     Please call your clinic at [phone] to:    Ask questions about your health    Make or cancel appointments    Discuss your medicines    Learn about your test results    Speak to your doctor            Additional Information About Your Visit        Quellan Information     Quellan gives you secure access to your electronic health record. If you see a primary care provider, you can also send messages to your care team and make appointments. If you have questions, please call your primary care clinic.  If you do not have a primary care provider, please call [phone] and they will assist you.      Quellan is an electronic gateway that provides easy, online access to your medical records. With Quellan, you can request a clinic appointment, read your test results, renew a prescription or communicate with your care team.     To access your existing account, please contact your  "[facility] or call [phone] for assistance.        Care EveryWhere ID     This is your Care EveryWhere ID. This could be used by other organizations to access your [facility] medical records  [id number]        Your Vitals Were     Pulse Temperature Respirations Height Pulse Oximetry BMI (Body Mass Index)    70 98.5  F (36.9  C) (Oral) 20 5' 7.84\" (172.3 cm) 99% 38.63 kg/m2       Blood Pressure from Last 3 Encounters:   09/06/18 118/75   05/04/18 119/80   03/12/18 118/72    Weight from Last 3 Encounters:   09/06/18 252 lb 12.8 oz (114.7 kg)   05/04/18 257 lb (116.6 kg)   03/12/18 253 lb 3.2 oz (114.9 kg)              We Performed the Following     ADMIN VACCINE, INITIAL     TDAP VACCINE (BOOSTRIX)          Today's Medication Changes          These changes are accurate as of 9/6/18  3:24 PM.  If you have any questions, ask your nurse or doctor.               Start taking these medicines.        Dose/Directions    fluticasone-salmeterol 230-21 MCG/ACT inhaler   Commonly known as:  ADVAIR-HFA   Used for:  Mild persistent asthma without complication   Started by:  Isaías Gray MD        Dose:  2 puff   Inhale 2 puffs into the lungs 2 times daily   Quantity:  36 g   Refills:  11       Salicylic Acid 40 % Pads   Used for:  Common wart   Started by:  Isaías Gray MD        Apply for 12 hours a day.  Replace every day.   Quantity:  36 each   Refills:  11         Stop taking these medicines if you haven't already. Please contact your care team if you have questions.     fluticasone 44 MCG/ACT Inhaler   Commonly known as:  FLOVENT HFA   Stopped by:  Isaías Gray MD           ketoconazole 2 % shampoo   Commonly known as:  NIZORAL   Stopped by:  Isaías Gray MD           ofloxacin 0.3 % otic solution   Commonly known as:  FLOXIN   Stopped by:  Isaías Gray MD                Where to get your medicines      These medications were sent to [id number] - [place], [place] " - [address] [facility] [address]  [address], [place] [address]     Phone:  [phone]     fluticasone 50 MCG/ACT spray    fluticasone-salmeterol 230-21 MCG/ACT inhaler    Salicylic Acid 40 % Pads                Primary Care Provider Office Phone # Fax #    Shelly Gordon [phone] [address] [facility] [address]        Equal Access to Services     LENORE SMITH : Hadii aad ku hadasho Soomaali, waaxda luqadaha, qaybta kaalmada adeegyada, waxay idiin hayaan adeeg kharash lamarce . So [facility] [phone].    ATENCIÓN: Si habla español, tiene a king disposición servicios gratuitos de asistencia lingüística. [facility] [phone].    We comply with applicable federal civil rights laws and [place] laws. We do not discriminate on the basis of race, color, national origin, age, disability, sex, sexual orientation, or gender identity.            Thank you!     Thank you for choosing [facility]  for your care. Our goal is always to provide you with excellent care. Hearing back from our patients is one way we can continue to improve our services. Please take a few minutes to complete the written survey that you may receive in the mail after your visit with us. Thank you!             Your Updated Medication List - Protect others around you: Learn how to safely use, store and throw away your medicines at www.disposemymeds.org.          This list is accurate as of 9/6/18  3:24 PM.  Always use your most recent med list.                   Brand Name Dispense Instructions for use Diagnosis    albuterol 108 (90 Base) MCG/ACT inhaler    PROAIR HFA    1 Inhaler    Inhale 2 puffs into the lungs every 6 hours as needed for shortness of breath / dyspnea or wheezing    Intermittent asthma, uncomplicated       fluticasone 50 MCG/ACT spray    FLONASE    1 Bottle    Spray 1-2 sprays into both nostrils daily    Acute seasonal allergic rhinitis, unspecified trigger       fluticasone-salmeterol  230-21 MCG/ACT inhaler    ADVAIR-HFA    36 g    Inhale 2 puffs into the lungs 2 times daily    Mild persistent asthma without complication       Salicylic Acid 40 % Pads     36 each    Apply for 12 hours a day.  Replace every day.    Common wart

## 2018-09-07 ASSESSMENT — ASTHMA QUESTIONNAIRES: ACT_TOTALSCORE: 10

## 2018-09-10 DIAGNOSIS — J45.40 MODERATE PERSISTENT ASTHMA WITHOUT COMPLICATION: ICD-10-CM

## 2018-09-11 RX ORDER — FLUTICASONE PROPIONATE AND SALMETEROL XINAFOATE 230; 21 UG/1; UG/1
2 AEROSOL, METERED RESPIRATORY (INHALATION) 2 TIMES DAILY
Qty: 36 G | Refills: 11 | Status: SHIPPED | OUTPATIENT
Start: 2018-09-11 | End: 2018-09-18

## 2018-09-13 ENCOUNTER — TELEPHONE (OUTPATIENT)
Dept: FAMILY MEDICINE | Facility: CLINIC | Age: 23
End: 2018-09-13

## 2018-09-13 DIAGNOSIS — J45.40 MODERATE PERSISTENT ASTHMA WITHOUT COMPLICATION: Primary | ICD-10-CM

## 2018-09-13 NOTE — TELEPHONE ENCOUNTER
[facility] phone call message- general phone call:    Reason for call: Pt is calling to see if we can get a generic kind that his insurance will cover for medication fluticasone-salmeterol (ADVAIR-HFA) 230-21 MCG/ACT inhaler. Also pt states he lost his albuterol (PROAIR HFA) 108 (90 BASE) MCG/ACT Inhaler and would like to see if he can get another prescribed.     Return call needed: Yes    OK to leave a message on voice mail? Yes    Primary language: English      needed? No    Call taken on September 13, 2018 at 9:16 AM by Grisel Flores-Cardona

## 2018-09-17 NOTE — TELEPHONE ENCOUNTER
Called [facility] to see if there were any other alternatives that would be covered. They said the Dulera or Symbicort will cover. Please send new rx for one of these 2. Also pt states that he lost his albuterol inhaler and would another one. [facility] will send a request thru to his insurance and see if they can override it for him, he still does have some refills left. Note routed to Dr. Gordon. Jorge Diaz, CMA

## 2018-09-18 RX ORDER — ALBUTEROL SULFATE 90 UG/1
2 AEROSOL, METERED RESPIRATORY (INHALATION) EVERY 4 HOURS PRN
Qty: 2 INHALER | Refills: 11 | Status: SHIPPED | OUTPATIENT
Start: 2018-09-18 | End: 2018-10-12

## 2018-09-18 RX ORDER — BUDESONIDE AND FORMOTEROL FUMARATE DIHYDRATE 160; 4.5 UG/1; UG/1
2 AEROSOL RESPIRATORY (INHALATION) 2 TIMES DAILY
Qty: 3 INHALER | Refills: 1 | Status: SHIPPED | OUTPATIENT
Start: 2018-09-18 | End: 2018-09-21

## 2018-09-21 ENCOUNTER — TELEPHONE (OUTPATIENT)
Dept: FAMILY MEDICINE | Facility: CLINIC | Age: 23
End: 2018-09-21

## 2018-09-21 DIAGNOSIS — J45.40 MODERATE PERSISTENT ASTHMA WITHOUT COMPLICATION: Primary | ICD-10-CM

## 2018-09-21 RX ORDER — FLUTICASONE PROPIONATE AND SALMETEROL 113; 14 UG/1; UG/1
1 POWDER, METERED RESPIRATORY (INHALATION) 2 TIMES DAILY
Qty: 3 INHALER | Refills: 3 | Status: SHIPPED | OUTPATIENT
Start: 2018-09-21 | End: 2019-08-13

## 2018-09-25 DIAGNOSIS — J45.40 MODERATE PERSISTENT ASTHMA WITHOUT COMPLICATION: Primary | ICD-10-CM

## 2018-09-27 DIAGNOSIS — J45.40 MODERATE PERSISTENT ASTHMA WITHOUT COMPLICATION: ICD-10-CM

## 2018-10-02 ENCOUNTER — TELEPHONE (OUTPATIENT)
Dept: FAMILY MEDICINE | Facility: CLINIC | Age: 23
End: 2018-10-02

## 2018-10-02 NOTE — TELEPHONE ENCOUNTER
Pharmacy Note    Was asked by Dr. Gordon to look into the ICS/LABA inhaler situation, as she received a PA form for Dulera, but thought the patient was taking generic Air Duo (Rx sent 9/21).    I called [facility] and they said the generic Air Duo was covered and the patient picked it up.    I removed Dulera from the pt's medication list and discarded the PA request for Dulera.    Joselyn Cabrera, Pharm.D.

## 2018-10-12 DIAGNOSIS — J45.40 MODERATE PERSISTENT ASTHMA WITHOUT COMPLICATION: ICD-10-CM

## 2018-10-12 RX ORDER — ALBUTEROL SULFATE 90 UG/1
2 AEROSOL, METERED RESPIRATORY (INHALATION) EVERY 4 HOURS PRN
Qty: 2 INHALER | Refills: 11 | Status: SHIPPED | OUTPATIENT
Start: 2018-10-12 | End: 2022-03-23

## 2019-08-06 DIAGNOSIS — J45.40 MODERATE PERSISTENT ASTHMA WITHOUT COMPLICATION: ICD-10-CM

## 2019-08-07 RX ORDER — INHALER,ASSIST DEVICE,LG MASK
SPACER (EA) MISCELLANEOUS
Qty: 1 EACH | Refills: 0 | Status: SHIPPED | OUTPATIENT
Start: 2019-08-07

## 2019-08-13 ENCOUNTER — TELEPHONE (OUTPATIENT)
Dept: FAMILY MEDICINE | Facility: CLINIC | Age: 24
End: 2019-08-13

## 2019-08-13 DIAGNOSIS — J45.40 MODERATE PERSISTENT ASTHMA WITHOUT COMPLICATION: Primary | ICD-10-CM

## 2019-08-13 RX ORDER — BUDESONIDE AND FORMOTEROL FUMARATE DIHYDRATE 160; 4.5 UG/1; UG/1
2 AEROSOL RESPIRATORY (INHALATION) 2 TIMES DAILY
Qty: 1 INHALER | Refills: 3 | Status: SHIPPED | OUTPATIENT
Start: 2019-08-13 | End: 2022-03-23

## 2019-08-13 NOTE — TELEPHONE ENCOUNTER
Received message that patients fluticasone/salmeerol 113/14 (Airduo Respiclick): 1 puff BID is no longer covered. PA is not needed. Sending new prescription to pharmacy for Symbicort 160/4.5: 2 puffs BID.    Cassandra Mukherjee, PharmD on 8/13/2019 at 4:12 PM

## 2019-09-20 ENCOUNTER — OFFICE VISIT (OUTPATIENT)
Dept: FAMILY MEDICINE | Facility: CLINIC | Age: 24
End: 2019-09-20
Payer: COMMERCIAL

## 2019-09-20 VITALS
TEMPERATURE: 98.1 F | RESPIRATION RATE: 16 BRPM | DIASTOLIC BLOOD PRESSURE: 71 MMHG | HEART RATE: 70 BPM | BODY MASS INDEX: 38.95 KG/M2 | SYSTOLIC BLOOD PRESSURE: 105 MMHG | HEIGHT: 68 IN | WEIGHT: 257 LBS

## 2019-09-20 DIAGNOSIS — Z11.4 SCREENING FOR HIV (HUMAN IMMUNODEFICIENCY VIRUS): ICD-10-CM

## 2019-09-20 DIAGNOSIS — B35.4 TINEA CORPORIS: Primary | ICD-10-CM

## 2019-09-20 DIAGNOSIS — R53.83 FATIGUE, UNSPECIFIED TYPE: ICD-10-CM

## 2019-09-20 DIAGNOSIS — E66.812 CLASS 2 OBESITY DUE TO EXCESS CALORIES WITHOUT SERIOUS COMORBIDITY WITH BODY MASS INDEX (BMI) OF 39.0 TO 39.9 IN ADULT: ICD-10-CM

## 2019-09-20 DIAGNOSIS — E66.09 CLASS 2 OBESITY DUE TO EXCESS CALORIES WITHOUT SERIOUS COMORBIDITY WITH BODY MASS INDEX (BMI) OF 39.0 TO 39.9 IN ADULT: ICD-10-CM

## 2019-09-20 DIAGNOSIS — H60.391 INFECTIVE OTITIS EXTERNA, RIGHT: ICD-10-CM

## 2019-09-20 LAB
ALBUMIN SERPL-MCNC: 4.7 MG/DL (ref 3.9–5.1)
ALP SERPL-CCNC: 92.7 U/L (ref 40–150)
ALT SERPL-CCNC: 33.7 U/L (ref 0–45)
AST SERPL-CCNC: 18 U/L (ref 0–55)
BILIRUB SERPL-MCNC: 0.6 MG/DL (ref 0.2–1.3)
BILIRUBIN DIRECT: 0.3 MG/DL (ref 0–0.2)
BUN SERPL-MCNC: 8.3 MG/DL (ref 7–21)
CALCIUM SERPL-MCNC: 8.8 MG/DL (ref 8.5–10.1)
CHLORIDE SERPLBLD-SCNC: 96.9 MMOL/L (ref 98–110)
CHOLEST SERPL-MCNC: 126.1 MG/DL (ref 0–200)
CHOLEST/HDLC SERPL: 3.8 {RATIO} (ref 0–5)
CO2 SERPL-SCNC: 29.3 MMOL/L (ref 20–32)
CREAT SERPL-MCNC: 0.8 MG/DL (ref 0.7–1.3)
GFR SERPL CREATININE-BSD FRML MDRD: >90 ML/MIN/1.7 M2
GLUCOSE SERPL-MCNC: 109.2 MG'DL (ref 70–99)
HBA1C MFR BLD: 5.4 % (ref 4.1–5.7)
HCT VFR BLD AUTO: 49.1 % (ref 40–53)
HDLC SERPL-MCNC: 33.4 MG/DL
HEMOGLOBIN: 16.2 G/DL (ref 13.3–17.7)
HIV 1+2 AB+HIV1 P24 AG SERPL QL IA: NEGATIVE
LDLC SERPL CALC-MCNC: 73 MG/DL (ref 0–129)
MCH RBC QN AUTO: 29.4 PG (ref 26.5–35)
MCHC RBC AUTO-ENTMCNC: 33 G/DL (ref 32–36)
MCV RBC AUTO: 89.1 FL (ref 78–100)
PLATELET # BLD AUTO: 298 K/UL (ref 150–450)
POTASSIUM SERPL-SCNC: 3.8 MMOL/DL (ref 3.2–4.6)
PROT SERPL-MCNC: 7.4 G/DL (ref 6.8–8.8)
RBC # BLD AUTO: 5.5 M/UL (ref 4.4–5.9)
SODIUM SERPL-SCNC: 133.2 MMOL/L (ref 132–142)
TRIGL SERPL-MCNC: 97.9 MG/DL (ref 0–150)
TSH SERPL DL<=0.05 MIU/L-ACNC: 1.16 UIU/ML (ref 0.3–5)
VLDL CHOLESTEROL: 19.6 MG/DL (ref 7–32)
WBC # BLD AUTO: 6.9 K/UL (ref 4–11)

## 2019-09-20 RX ORDER — TERBINAFINE HYDROCHLORIDE 250 MG/1
250 TABLET ORAL DAILY
Qty: 14 TABLET | Refills: 1 | Status: SHIPPED | OUTPATIENT
Start: 2019-09-20 | End: 2019-10-04

## 2019-09-20 RX ORDER — CIPROFLOXACIN AND DEXAMETHASONE 3; 1 MG/ML; MG/ML
4 SUSPENSION/ DROPS AURICULAR (OTIC) 2 TIMES DAILY
Qty: 2 ML | Refills: 0 | Status: SHIPPED | OUTPATIENT
Start: 2019-09-20 | End: 2019-09-25

## 2019-09-20 ASSESSMENT — MIFFLIN-ST. JEOR: SCORE: 2117.3

## 2019-09-20 NOTE — LETTER
September 24, 2019      Winston Vogt  [address] [place] [address]        Dear Winston,    Please see below for your test results.    Resulted Orders   Hemoglobin A1c ([facility])   Result Value Ref Range    Hemoglobin A1C 5.4 4.1 - 5.7 %   Lipid Panel ([facility])   Result Value Ref Range    Cholesterol 126.1 0.0 - 200.0 mg/dL    Cholesterol/HDL Ratio 3.8 0.0 - 5.0    HDL Cholesterol 33.4 (L) >40.0 mg/dL    LDL Cholesterol Calculated 73 0 - 129 mg/dL    Triglycerides 97.9 0.0 - 150.0 mg/dL    VLDL Cholesterol 19.6 7.0 - 32.0 mg/dL   HIV Ag/Ab Screen [place] ([employer])   Result Value Ref Range    HIV Antigen/Antibody Negative Negative    Narrative    Test performed by:  [facility] [address] [place] [address]  Method is Abbott HIV Ag/Ab for the detection of HIV p24 antigen, HIV-1   antibodies and HIV-2 antibodies.   Thyroid [place] ([employer])   Result Value Ref Range    TSH 1.16 0.30 - 5.00 uIU/mL    Narrative    Test performed by:  [facility] [address] [place] [address]   HEPATIC PANEL ([facility])   Result Value Ref Range    Albumin 4.7 3.9 - 5.1 mg/dL    Alkaline Phosphatase 92.7 40.0 - 150.0 U/L    ALT 33.7 0.0 - 45.0 U/L    AST 18.0 0.0 - 55.0 U/L    Bilirubin Direct 0.3 (H) 0.0 - 0.2 mg/dL    Bilirubin Total 0.6 0.2 - 1.3 mg/dL    Protein Total 7.4 6.8 - 8.8 g/dL   Basic Metabolic Panel ([facility])   Result Value Ref Range    Urea Nitrogen 8.3 7.0 - 21.0 mg/dL    Calcium 8.8 8.5 - 10.1 mg/dL    Chloride 96.9 (L) 98.0 - 110.0 mmol/L    Carbon Dioxide 29.3 20.0 - 32.0 mmol/L    Creatinine 0.8 0.7 - 1.3 mg/dL    Glucose 109.2 (H) 70.0 - 99.0 mg'dL    Potassium 3.8 3.2 - 4.6 mmol/dL    Sodium 133.2 132.0 - 142.0 mmol/L    GFR Estimate >90 >60.0 mL/min/1.7 m2    GFR Estimate If Black >90 >60.0 mL/min/1.7 m2   CBC with Plt ([facility])   Result Value Ref Range    WBC 6.9 4.0 - 11.0 K/uL    RBC 5.5 4.4 - 5.9 M/uL    Hemoglobin 16.2 13.3 - 17.7 g/dL    Hematocrit 49.1 40.0 - 53.0 %    MCV 89.1  78.0 - 100.0 fL    MCH 29.4 26.5 - 35.0    MCHC 33.0 32.0 - 36.0 g/dL    Platelets 298.0 150.0 - 450.0 K/uL     Normal cholesterol, liver tests, electrolytes, kidney tests, thyroid tests, and blood counts.  Normal diabetes screening test.  Negative for HIV.    We can discuss ways to stay healthy at your follow up visit.      If you have any questions, please call the clinic to make an appointment.    Sincerely,    Shelly Gordon MD

## 2019-09-20 NOTE — PATIENT INSTRUCTIONS
Patient Education     Fungal Skin Infection (Tinea)  A fungal infection occurs when too much fungus grows on or in the body. Fungus normally lives on the skin in small amounts and does not cause harm. But when too much grows on the skin, it causes an infection. This is also known as tinea. Fungal skin infections are common and not usually serious.  The infection often starts as a small red area the size of a pea. The skin may turn dry and flaky. The area may itch. As the fungus grows, it spreads out in a red [place]. Because of how it looks, fungal skin infection is often called ringworm, but it is not caused by a worm. Fungal skin infections can occur on many parts of the body. They can grow on the head, chest, arms, or legs. They can occur on the buttocks. On the feet, fungal infection is known as  athlete s foot.  It causes itchy, sometimes painful sores between the toes and the bottom or sides of the feet. In the groin, the rash is called  jock itch.   People with weak immune systems can get a fungal infection more easily. This includes people with diabetes or HIV, or who are being treated for cancer. In these cases, the fungal infection can spread and cause severe illness. Fungal infections are also more common in people who are overweight.  In most cases, treatment is done with antifungal cream or ointment. If the infection is on your scalp, you may take oral medicine. In some cases, a tiny piece of the skin (biopsy) may be taken. This is so it can be tested in a lab.  Common fungal infections are treated with creams on the skin or oral medicine.  Home care  Follow all instructions when using antifungal cream or ointment on your skin. Your healthcare provider may advise using cornstarch powder to keep your skin dry or petroleum jelly to provide a barrier.  General care:    If you were prescribed an oral medicine, read the patient information. Talk with your healthcare provider about the risks and side  effects.    Let your skin dry completely after bathing. Carefully dry your feet and between your toes.    Dress in loose cotton clothing.    Don t scratch the affected area. This can delay healing and may spread the infection. It can also cause a bacterial infection.    Keep your skin clean, but don t wash the skin too much. This can irritate your skin.    Keep in mind that it may take a week before the fungus starts to go away. It can take 2 to 4 weeks to fully clear. To prevent it from coming back, use the medicine until the rash is all gone.  Follow-up care  Follow up with your healthcare provider if the rash does not get better after 10 days of treatment. Also follow up if the rash spreads to other parts of your body.  When to seek medical advice  Call your healthcare provider right away if any of these occur:    Fever of 100.4 F (38 C) or higher    Redness or swelling that gets worse    Pain that gets worse    Foul-smelling fluid leaking from the skin  Date Last Reviewed: 11/1/2016 2000-2018 [employer]. [address] [place] [address], [place] [address]. All rights reserved. This information is not intended as a substitute for professional medical care. Always follow your healthcare professional's instructions.

## 2019-09-20 NOTE — PROGRESS NOTES
"    There are no exam notes on file for this visit.  Chief Complaint   Patient presents with     Ear Problem     right ear having green discharge at times     Eczema     Blood pressure 105/71, pulse 70, temperature 98.1  F (36.7  C), resp. rate 16, height 1.715 m (5' 7.5\"), weight 116.6 kg (257 lb).  Body mass index is 39.66 kg/m .             KVNG Vogt is a 24 year old  male with a PMH significant for:     Patient Active Problem List   Diagnosis     Tinea versicolor     Non morbid obesity     Strabismus     Allergic rhinitis     Unspecified mental or behavioral problem     Moderate persistent asthma without complication     He presents with eczema and ear drainage.    He has had skin problems with itchy rash seasonally over the last few years.  He has been collated eczema but it appears in the chart that is not diagnosed with tinea corporis and/or tinea versicolor.  He states that he received a pill medicine in the past that helped cleared up quite quickly and well.  He does think that he gets the rash more in the summer when he is sweating and hot.  We discussed risk factors for fungal infections in detail.  In reviewing the med list it looks like to get terbinafine when should not be adequate to treat tinea versicolor although past note appears likely that it was appearing more to be tinea versicolor.  He received a month of treatment in the past.  Which cleared it up.  This is consistent with med list history.  He states that multiple of his siblings whom he lives with but does not share a bed  with, have similar rash.  His family thinks it is eczema.  Discussed the difference between and fungal infections and eczema.    Also has a history of right ear surgery.  He did have follow-up with ENT after the surgery.  But missed subsequent follow-up.  He would like to go back and see them because he had some ear discomfort and drainage periodically.  He has noted foul-smelling greenish-whitish discharge that " "is creamy coming from his ear.  He does not particularly have pain.  No fevers or chills.    I also discussed that his fungal infections of his skin could be made worse by diagnosis such as diabetes or immune insufficiency.  He is morbidly obese with BMI of 39 and needs a visit to discuss this and lab evaluation for diabetes mellitus, cholesterol and HIV.  He agrees to lab evaluation and follow-up visit to discuss lifestyle modifications for obesity in the future.    PMH, Medications and Allergies were reviewed and updated as needed.                Physical Exam:     Vitals:    09/20/19 1424   BP: 105/71   Pulse: 70   Resp: 16   Temp: 98.1  F (36.7  C)   Weight: 116.6 kg (257 lb)   Height: 1.715 m (5' 7.5\")     Body mass index is 39.66 kg/m .    Exam:  Constitutional: healthy, alert and no distress  Neck: Neck supple. No adenopathy. Thyroid symmetric, normal size,  Eyes: PERRLA, EOMI, conjunctiva are clear.  ENT: No nasal discharge. TMs clear on the left but with sclerosis.  Right ear has greenish-whitish creamy discharge and full TM cannot be seen.  External canal is erythematous as well.  Nontender to palpation of the pinna.  Oropharynx clear with no erythema or exudates.  Cardiovascular:RRR. No murmurs, clicks gallops or rub  Respiratory:  normal respiratory rate and rhythm, lungs clear to auscultation. No wheezes or crackles.  Skin: Fine hyperpigmentation patches with scaly borders throughout his back of his neck up to his scalp line and around his neck chest and shoulders.  Psychiatric: mentation appears normal and affect normal/bright        Results for orders placed or performed in visit on 09/20/19   Hemoglobin A1c ([facility])   Result Value Ref Range    Hemoglobin A1C 5.4 4.1 - 5.7 %   Lipid Panel ([facility])   Result Value Ref Range    Cholesterol 126.1 0.0 - 200.0 mg/dL    Cholesterol/HDL Ratio 3.8 0.0 - 5.0    HDL Cholesterol 33.4 (L) >40.0 mg/dL    LDL Cholesterol Calculated 73 0 - 129 mg/dL    Triglycerides " 97.9 0.0 - 150.0 mg/dL    VLDL Cholesterol 19.6 7.0 - 32.0 mg/dL   HIV Ag/Ab Screen [place] ([facility])   Result Value Ref Range    HIV Antigen/Antibody Negative Negative    Narrative    Test performed by:  [facility]  [address] [place] [address]  Method is Abbott HIV Ag/Ab for the detection of HIV p24 antigen, HIV-1   antibodies and HIV-2 antibodies.   Thyroid [place] ([facility])   Result Value Ref Range    TSH 1.16 0.30 - 5.00 uIU/mL    Narrative    Test performed by:  [facility]  [address] [place] [address]   HEPATIC PANEL ([facility])   Result Value Ref Range    Albumin 4.7 3.9 - 5.1 mg/dL    Alkaline Phosphatase 92.7 40.0 - 150.0 U/L    ALT 33.7 0.0 - 45.0 U/L    AST 18.0 0.0 - 55.0 U/L    Bilirubin Direct 0.3 (H) 0.0 - 0.2 mg/dL    Bilirubin Total 0.6 0.2 - 1.3 mg/dL    Protein Total 7.4 6.8 - 8.8 g/dL   Basic Metabolic Panel ([facility])   Result Value Ref Range    Urea Nitrogen 8.3 7.0 - 21.0 mg/dL    Calcium 8.8 8.5 - 10.1 mg/dL    Chloride 96.9 (L) 98.0 - 110.0 mmol/L    Carbon Dioxide 29.3 20.0 - 32.0 mmol/L    Creatinine 0.8 0.7 - 1.3 mg/dL    Glucose 109.2 (H) 70.0 - 99.0 mg'dL    Potassium 3.8 3.2 - 4.6 mmol/dL    Sodium 133.2 132.0 - 142.0 mmol/L    GFR Estimate >90 >60.0 mL/min/1.7 m2    GFR Estimate If Black >90 >60.0 mL/min/1.7 m2   CBC with Plt ([facility])   Result Value Ref Range    WBC 6.9 4.0 - 11.0 K/uL    RBC 5.5 4.4 - 5.9 M/uL    Hemoglobin 16.2 13.3 - 17.7 g/dL    Hematocrit 49.1 40.0 - 53.0 %    MCV 89.1 78.0 - 100.0 fL    MCH 29.4 26.5 - 35.0    MCHC 33.0 32.0 - 36.0 g/dL    Platelets 298.0 150.0 - 450.0 K/uL       Assessment and Plan     Winston was seen today for ear problem and eczema.    Diagnoses and all orders for this visit:    Tinea corporis: Rash cleared up with terbinafine in the past.  I agree that the rash looks more like tinea versicolor to me but since past rash went away with terbinafine I expect this is the same will give 2 weeks treatment and one  refill to use if needed.  If not going away after 4 weeks we will do scraping and change treatment to fluconazole.  -     terbinafine (LAMISIL) 250 MG tablet; Take 1 tablet (250 mg) by mouth daily for 14 days    Class 2 obesity due to excess calories without serious comorbidity with body mass index (BMI) of 39.0 to 39.9 in adult: Did not discuss weight loss plan in detail today but did get labs for screening for diabetes and elevated liver function and cholesterol.  Will discuss this further at next visit.  -     Hemoglobin A1c ([facility])  -     Lipid Panel ([facility])  -     HEPATIC PANEL ([facility])    Screening for HIV (human immunodeficiency virus): Has not had one-time screening of HIV that is recommended for all adults.  We will do this with lab draw today.  -     HIV Ag/Ab Screen [place] ([facility])    Fatigue, unspecified type: Has had increased fatigue may be from obesity and deconditioning but will rule out medical cause with lab work-up due to drawn labs for other reasons today.  -     Thyroid [place] ([facility])  -     Basic Metabolic Panel ([facility])  -     CBC with Plt ([facility])    Infective otitis externa, right: History of ear surgery in the recent past.  With otitis externa today.  Will give Ciprodex and have him go back to ENT for follow-up gave phone number of his ENT for him to call.  He entered it directly in his phone.  -     ciprofloxacin-dexamethasone (CIPRODEX) 0.3-0.1 % otic suspension; Place 4 drops into the right ear 2 times daily for 5 days        Patient Instructions     Patient Education     Fungal Skin Infection (Tinea)  A fungal infection occurs when too much fungus grows on or in the body. Fungus normally lives on the skin in small amounts and does not cause harm. But when too much grows on the skin, it causes an infection. This is also known as tinea. Fungal skin infections are common and not usually serious.  The infection often starts as a small red area the size of a pea. The skin may  turn dry and flaky. The area may itch. As the fungus grows, it spreads out in a red [place]. Because of how it looks, fungal skin infection is often called ringworm, but it is not caused by a worm. Fungal skin infections can occur on many parts of the body. They can grow on the head, chest, arms, or legs. They can occur on the buttocks. On the feet, fungal infection is known as  athlete s foot.  It causes itchy, sometimes painful sores between the toes and the bottom or sides of the feet. In the groin, the rash is called  jock itch.   People with weak immune systems can get a fungal infection more easily. This includes people with diabetes or HIV, or who are being treated for cancer. In these cases, the fungal infection can spread and cause severe illness. Fungal infections are also more common in people who are overweight.  In most cases, treatment is done with antifungal cream or ointment. If the infection is on your scalp, you may take oral medicine. In some cases, a tiny piece of the skin (biopsy) may be taken. This is so it can be tested in a lab.  Common fungal infections are treated with creams on the skin or oral medicine.  Home care  Follow all instructions when using antifungal cream or ointment on your skin. Your healthcare provider may advise using cornstarch powder to keep your skin dry or petroleum jelly to provide a barrier.  General care:    If you were prescribed an oral medicine, read the patient information. Talk with your healthcare provider about the risks and side effects.    Let your skin dry completely after bathing. Carefully dry your feet and between your toes.    Dress in loose cotton clothing.    Don t scratch the affected area. This can delay healing and may spread the infection. It can also cause a bacterial infection.    Keep your skin clean, but don t wash the skin too much. This can irritate your skin.    Keep in mind that it may take a week before the fungus starts to go away. It can  take 2 to 4 weeks to fully clear. To prevent it from coming back, use the medicine until the rash is all gone.  Follow-up care  Follow up with your healthcare provider if the rash does not get better after 10 days of treatment. Also follow up if the rash spreads to other parts of your body.  When to seek medical advice  Call your healthcare provider right away if any of these occur:    Fever of 100.4 F (38 C) or higher    Redness or swelling that gets worse    Pain that gets worse    Foul-smelling fluid leaking from the skin  Date Last Reviewed: 11/1/2016 2000-2018 [employer]. [address] [place] [address]. All rights reserved. This information is not intended as a substitute for professional medical care. Always follow your healthcare professional's instructions.                Options for treatment and/or follow-up care were reviewed with the patient. Winston Vogt was engaged and actively involved in the decision making process. He verbalized understanding of the options discussed and was satisfied with the final plan.    Shelyl Gordon MD

## 2019-09-24 NOTE — RESULT ENCOUNTER NOTE
Normal cholesterol, liver tests, electrolytes, kidney tests, thyroid tests, and blood counts.  Normal diabetes screening test.  Negative for HIV.    We can discuss ways to stay healthy at your follow up visit.

## 2019-10-10 DIAGNOSIS — J30.2 ACUTE SEASONAL ALLERGIC RHINITIS: ICD-10-CM

## 2019-10-10 RX ORDER — FLUTICASONE PROPIONATE 50 MCG
SPRAY, SUSPENSION (ML) NASAL
Qty: 16 ML | Refills: 0 | Status: SHIPPED | OUTPATIENT
Start: 2019-10-10

## 2019-10-29 ENCOUNTER — TELEPHONE (OUTPATIENT)
Dept: FAMILY MEDICINE | Facility: CLINIC | Age: 24
End: 2019-10-29

## 2019-10-29 DIAGNOSIS — B35.4 TINEA CORPORIS: ICD-10-CM

## 2019-10-29 NOTE — TELEPHONE ENCOUNTER
[facility] phone call message- patient requesting a refill:    Full Medication Name: Terbinafine     Dose: 250 mg    Pharmacy confirmed as : [facility] [address]  at [facility], phone # [phone]   Fax# [phone]:  Yes    Additional Comments: pt is all out, wondering if someone could refill soon?      OK to leave a message on voice mail? Yes    Primary language: English      needed? No    Call taken on October 29, 2019 at 12:38 PM by Bridget Macias

## 2019-10-31 RX ORDER — TERBINAFINE HYDROCHLORIDE 250 MG/1
250 TABLET ORAL DAILY
Qty: 14 TABLET | Refills: 1 | OUTPATIENT
Start: 2019-10-31

## 2019-11-07 ENCOUNTER — HEALTH MAINTENANCE LETTER (OUTPATIENT)
Age: 24
End: 2019-11-07

## 2019-11-26 ENCOUNTER — OFFICE VISIT (OUTPATIENT)
Dept: FAMILY MEDICINE | Facility: CLINIC | Age: 24
End: 2019-11-26
Payer: COMMERCIAL

## 2019-11-26 VITALS
WEIGHT: 251 LBS | HEIGHT: 68 IN | SYSTOLIC BLOOD PRESSURE: 106 MMHG | OXYGEN SATURATION: 98 % | DIASTOLIC BLOOD PRESSURE: 68 MMHG | TEMPERATURE: 98.4 F | RESPIRATION RATE: 16 BRPM | BODY MASS INDEX: 38.04 KG/M2 | HEART RATE: 64 BPM

## 2019-11-26 DIAGNOSIS — B36.0 TINEA VERSICOLOR: Primary | ICD-10-CM

## 2019-11-26 RX ORDER — FLUCONAZOLE 200 MG/1
200 TABLET ORAL WEEKLY
Qty: 12 TABLET | Refills: 0 | Status: SHIPPED | OUTPATIENT
Start: 2019-11-26 | End: 2020-02-12

## 2019-11-26 ASSESSMENT — MIFFLIN-ST. JEOR: SCORE: 2090.09

## 2019-11-26 NOTE — PATIENT INSTRUCTIONS
Winston,    Thank you for coming in to clinic today!    1)  Fluconazole 1 tablet once per week for the next 3 months.  2)  Return in 2 months to recheck skin and check liver function testing (blood draw).  3)  If fevers or chills or worsening abdominal pain, diarrhea, or blood in stool, come back right away.    Thank you again!    Sincerely,    Dr. Stanford

## 2019-11-26 NOTE — PROGRESS NOTES
"HPI: Winston Vogt is a 24 year old male with a PMH of asthma, tinea veriscolor presenting to clinic with a recent hx of abdominal pain, diarrhea tinged with blood, and dehydration (11/23-11/25). Pt feels better and is concerned about further management.     Mr. Vogt disclosed that he ate eggs that had been sitting out at room temperature on Saturday and suspects that they made him ill. He started \"feeling funny\" on Saturday and had profuse diarrhea Sunday and Monday. He also had gagging and decreased appetite, but no actual emesis. He drank electrolyte drinks and water until the sx resolved late Monday. Denies any illness-related sx at this visit.    Pt has been taking terbinafine for tinea versicolor on his chest. He reports hair loss over past few years and suspected the tinea versicolor accounting for the hair loss. Denies itching, scaling of skin.  States that the skin rash over the chest almost completely resolved, as he just noticed a small patch of the rash over the left side of his neck as he finished the course of terbinafine, but the rash came back over the last few days.     Patient Active Problem List   Diagnosis     Tinea corporis     Class 2 obesity due to excess calories without serious comorbidity in adult     Strabismus     Allergic rhinitis     Unspecified mental or behavioral problem     Moderate persistent asthma without complication     Current Outpatient Medications   Medication Sig Dispense Refill     fluconazole (DIFLUCAN) 200 MG tablet Take 1 tablet (200 mg) by mouth once a week for 12 doses 12 tablet 0     fluticasone (FLONASE) 50 MCG/ACT nasal spray SHAKE LIQUID AND USE 1 TO 2 SPRAYS IN EACH NOSTRIL DAILY 16 mL 0     albuterol (PROAIR HFA) 108 (90 BASE) MCG/ACT Inhaler Inhale 2 puffs into the lungs every 6 hours as needed for shortness of breath / dyspnea or wheezing (Patient not taking: Reported on 11/26/2019) 1 Inhaler 12     albuterol (PROAIR HFA/PROVENTIL HFA/VENTOLIN HFA) 108 (90 " "Base) MCG/ACT inhaler Inhale 2 puffs into the lungs every 4 hours as needed for shortness of breath / dyspnea or wheezing (Patient not taking: Reported on 11/26/2019) 2 Inhaler 11     budesonide-formoterol (SYMBICORT) 160-4.5 MCG/ACT Inhaler Inhale 2 puffs into the lungs 2 times daily (Patient not taking: Reported on 11/26/2019) 1 Inhaler 3     Salicylic Acid 40 % PADS Apply for 12 hours a day.  Replace every day. (Patient not taking: Reported on 11/26/2019) 36 each 11     Spacer/Aero-Holding Chambers (OPTICHAMBER NAGI-LG MASK) DAVID USE AS DIRECTED (Patient not taking: Reported on 11/26/2019) 1 each 0     O: /68   Pulse 64   Temp 98.4  F (36.9  C) (Oral)   Resp 16   Ht 1.715 m (5' 7.5\")   Wt 113.9 kg (251 lb)   SpO2 98%   BMI 38.73 kg/m     Constitutional:  Well nourished and in no acute distress.  Eyes: EOMI.  No scleral icterus noted. PERRL.  ENT/Mouth: Mucous membranes appear moist, patient appears well-hydrated.  CV:  RRR  - no murmurs noted.   Respiratory:  CTAB, no wheezes or crackles noted, good air entry.   GI/Abdomen: Soft, nontender, bowel sounds present.  Integument: fine macular discolorations on chest, good skin turgor.  Extrem: No lower extremity edema.  The calves are nontender bilaterally.  Psych: normal affect, euthymic.      Assessment and Plan:  Winston Vogt is a 24 year old male with a PMH of asthma, tinea veriscolor presenting to clinic with a recent hx of abdominal pain, diarrhea tinged with blood, and dehydration (11/23-11/25) for follow up after illness.    #Hx Gastroenteritis  -Advise pt to avoid food sitting at room temperature for greater than 2 hours.  -Advise patient to hydrate frequently.  -This seems likely to have been E. coli, but could also have been Shigella or Campylobacter given the blood-tinged stool.  Regardless, he is not having diarrhea any further and we did not feel it prudent to collect a stool sample today as his stools are back to normal.  He is eating and " drinking better.  He appears well-hydrated on exam.  I do not feel that there is anything further to do from a management standpoint at this time, but he was instructed to come back if he develops fever, recurrent diarrhea, or nausea/vomiting.    #Tinea Versicolor  -Completed 14 days oral terbinafine back in early October for presumed tinea corporis, Dr. Gordon was thinking this looked more like tinea versicolor, but apparently he had done well and responded well to terbinafine in the past, so he was treated with this.  Seems like the rash almost completely went away aside from some lesions on the left side of the neck, but then returned over the last few days.  -Start fluconazole 200mg 1x per week 12 ct; advise pt to take 1 hr after he gets sweaty.  -He should return in 2 months to check LFTs; if doing fine from that standpoint, can finish the course for total of 3 months.    #Premature Hair Loss  -F/U at future visit, as we did not discuss this much further today.    RTC in 2-3 months for f/u of tinea.    The patient was discussed and evaluated with Dr. Robin MD.    Juany Lagunas, MS-4    Resident/Fellow Attestation   I, Leandro Stanford, was present with the medical student who participated in the service and in the documentation of the note.  I have verified the history and personally performed the physical exam and medical decision making.  I agree with the assessment and plan of care as documented in the note.      Leandro Stanford, PGY-3  [facility]  Pager:  [phone]

## 2019-11-26 NOTE — LETTER
[facility] [address] [place] [address]  Phone: [phone]  Fax: [phone]    November 26, 2019        Winston Vogt  [address] [place] [address]          To whom it may concern:    RE: Winston Vogt    Patient was seen and treated today at our clinic.  He experienced a viral GI infection that has now resolved.  He should be excused from work from November 24-26 and can return to work tomorrow (11/27/19).    Please contact me for questions or concerns.      Sincerely,        Leandro Stanford MD

## 2019-11-26 NOTE — PROGRESS NOTES
Preceptor Attestation:   Patient seen, evaluated and discussed with the resident. I have verified the content of the note, which accurately reflects my assessment of the patient and the plan of care.   Supervising Physician:  Jessee Kelly MD.

## 2020-01-31 DIAGNOSIS — B36.0 TINEA VERSICOLOR: ICD-10-CM

## 2020-01-31 RX ORDER — FLUCONAZOLE 200 MG/1
TABLET ORAL
Qty: 12 TABLET | Refills: 0 | OUTPATIENT
Start: 2020-01-31

## 2020-03-10 ENCOUNTER — TELEPHONE (OUTPATIENT)
Dept: FAMILY MEDICINE | Facility: CLINIC | Age: 25
End: 2020-03-10

## 2020-04-10 ENCOUNTER — TELEPHONE (OUTPATIENT)
Dept: FAMILY MEDICINE | Facility: CLINIC | Age: 25
End: 2020-04-10

## 2020-04-10 NOTE — TELEPHONE ENCOUNTER
Reached out to patient during COVID19 Clinic outreach. Reassured patient that [facility] is still open and has started implementing phone and video appointments to help patient remain safe at home.     Patient reports the following concerns: No concerns. Does not want to schedule a visit at this time.     Offered MyChart. Patient accepted (already signed up)    Kimberly Coto MD

## 2020-10-29 ENCOUNTER — OFFICE VISIT (OUTPATIENT)
Dept: FAMILY MEDICINE | Facility: CLINIC | Age: 25
End: 2020-10-29
Payer: COMMERCIAL

## 2020-10-29 VITALS
SYSTOLIC BLOOD PRESSURE: 112 MMHG | TEMPERATURE: 98.2 F | RESPIRATION RATE: 18 BRPM | BODY MASS INDEX: 36.86 KG/M2 | HEART RATE: 77 BPM | WEIGHT: 243.2 LBS | OXYGEN SATURATION: 97 % | DIASTOLIC BLOOD PRESSURE: 72 MMHG | HEIGHT: 68 IN

## 2020-10-29 DIAGNOSIS — B36.0 TINEA VERSICOLOR: Primary | ICD-10-CM

## 2020-10-29 DIAGNOSIS — Z23 NEED FOR PROPHYLACTIC VACCINATION AND INOCULATION AGAINST INFLUENZA: ICD-10-CM

## 2020-10-29 PROCEDURE — 90471 IMMUNIZATION ADMIN: CPT | Performed by: STUDENT IN AN ORGANIZED HEALTH CARE EDUCATION/TRAINING PROGRAM

## 2020-10-29 PROCEDURE — 99213 OFFICE O/P EST LOW 20 MIN: CPT | Mod: 25 | Performed by: STUDENT IN AN ORGANIZED HEALTH CARE EDUCATION/TRAINING PROGRAM

## 2020-10-29 PROCEDURE — 90686 IIV4 VACC NO PRSV 0.5 ML IM: CPT | Performed by: STUDENT IN AN ORGANIZED HEALTH CARE EDUCATION/TRAINING PROGRAM

## 2020-10-29 RX ORDER — PRENATAL VIT 91/IRON/FOLIC/DHA 28-975-200
COMBINATION PACKAGE (EA) ORAL 2 TIMES DAILY PRN
Qty: 24 G | Refills: 1 | Status: SHIPPED | OUTPATIENT
Start: 2020-10-29

## 2020-10-29 RX ORDER — TERBINAFINE HYDROCHLORIDE 250 MG/1
250 TABLET ORAL DAILY
Qty: 7 TABLET | Refills: 0 | Status: SHIPPED | OUTPATIENT
Start: 2020-10-29 | End: 2020-11-05

## 2020-10-29 ASSESSMENT — MIFFLIN-ST. JEOR: SCORE: 2050.03

## 2020-10-29 NOTE — PROGRESS NOTES
"[facility] Medicine Clinic Visit    Subjective:  Winston Vogt is a 25 year old male with a PMHx significant for   Patient Active Problem List   Diagnosis     Tinea corporis     Class 2 obesity due to excess calories without serious comorbidity in adult     Strabismus     Allergic rhinitis     Unspecified mental or behavioral problem     Moderate persistent asthma without complication      who presents with concern for fungal infection. He has a history of multiple fungal infections that have completely resolved with oral terbinafine or oral fluconazole. He reports it spanning the chest, neck, scalp, head and some on arms. Usually starts as small spot and spreads. Current flare started 3 months ago. Thinks it may be associated with hair loss. No itching or painful. No new exposures (shampoos, conditioners, lotions, soaps). No environmental exposures. Feels it is due to his microbiome and is candida. Thinks may also be associated with worsening of his asthma, although endorses current asthma medications effective.    ROS:   A complete 12-point ROS was obtained and was negative except as stated above in HPI.    Objective:  Vitals:    10/29/20 1621   BP: 112/72   Pulse: 77   Resp: 18   Temp: 98.2  F (36.8  C)   TempSrc: Oral   SpO2: 97%   Weight: 110.3 kg (243 lb 3.2 oz)   Height: 1.715 m (5' 7.52\")     Body mass index is 37.51 kg/m .    GEN: NAD, healthy, alert  SCALP: diffuse thinning of superior scalp. No fungal lesions noted  NECK: no LAD, asymmetry, masses, scars  RESP: CTAB, no w/r/r  CV: RRR, nl S1/S2, no S3/S4, no m/r/g  ABD: soft, ND  MSK: no MSK defects noted, gait is age appropriate w/o ataxia  SKIN: numerous small hyper- and hypo-pigmented plaques on chest, abdomen, and back.   PSYCH: mentation appears normal, affect normal/bright    No results found for this or any previous visit (from the past 24 hour(s)).    Assessment/Plan:  Winston was seen today for derm problem and imm/inj.    Diagnoses and all " orders for this visit:    Tinea versicolor  Exam consistent with tinea versicolor. Will again treat with oral terbinafine. Sent in terbinafine cream for next flare in an effort to stop spread. Discussed hygeine. Don't wait so long to seek treatment with next flare if not better with cream. No scalp lesions; hair thinning likely natural balding process unfortunately.   -     terbinafine (LAMISIL) 250 MG tablet; Take 1 tablet (250 mg) by mouth daily for 7 days  -     terbinafine (LAMISIL) 1 % external cream; Apply topically 2 times daily as needed    Need for prophylactic vaccination and inoculation against influenza  -     INFLUENZA VACCINE IM > 6 MONTHS VALENT IIV4 [44237]    Options for treatment and follow-up care were reviewed with the patient who was engaged and actively involved in the decision making process, verbalized understanding of the options discussed, and satisfied with the final plan.    Patient was staffed with supervising physician, Dr. Rendon.     Patient was seen and discussed with my resident and attending physician, who are in agreement with my assessment and plan.  Ana Degroot, MS3     I, Rad Hahn, was present with the medical student who participated in the service and in the documentation of this note. I have verified the history and personally performed the physical exam and medical decision making, and have verified the content of the note, which accurately reflects my assessment of the patient and the plan of care.    Rad Hahn MD PGY3  [facility]

## 2020-10-29 NOTE — PATIENT INSTRUCTIONS
Use the oral medication as prescribed for 7 days.     Use the cream in the future to prevent spread when new flares start.

## 2020-11-29 ENCOUNTER — HEALTH MAINTENANCE LETTER (OUTPATIENT)
Age: 25
End: 2020-11-29

## 2020-11-29 NOTE — MR AVS SNAPSHOT
After Visit Summary   2/15/2018    Winston Vogt    MRN: [id number]           Patient Information     Date Of Birth          1995        Visit Information        Provider Department      2/15/2018 9:20 AM Antelmo Harmon MD [facility]        Today's Diagnoses     Chronic otitis media of right ear with perforated tympanic membrane    -  1    Strabismus        Tinea versicolor          Care Instructions    - See eye doctor  - Try ear drops again for 1 week  - Ketoconazole shampoo refilled  - Return to clinic in 2-4 weeks. Hearing screen then          Follow-ups after your visit        Additional Services     OPHTHALMOLOGY ADULT REFERRAL       Your provider has referred you to: [facility]: [facility] [phone]   [email or web address]    Please be aware that coverage of these services is subject to the terms and limitations of your health insurance plan.  Call member services at your health plan with any benefit or coverage questions.      Please bring the following with you to your appointment:    (1) Any X-Rays, CTs or MRIs which have been performed.  Contact the facility where they were done to arrange for  prior to your scheduled appointment.    (2) List of current medications  (3) This referral request   (4) Any documents/labs given to you for this referral                  Who to contact     Please call your clinic at [phone] to:    Ask questions about your health    Make or cancel appointments    Discuss your medicines    Learn about your test results    Speak to your doctor            Additional Information About Your Visit        MyChart Information     [employer] gives you secure access to your electronic health record. If you see a primary care provider, you can also send messages to your care team and make appointments. If you have questions, please call your primary care clinic.  If you do not have a primary care  "provider, please call [phone] and they will assist you.      [employer] is an electronic gateway that provides easy, online access to your medical records. With [employer], you can request a clinic appointment, read your test results, renew a prescription or communicate with your care team.     To access your existing account, please contact your [facility] or call [phone] for assistance.        Care EveryWhere ID     This is your Care EveryWhere ID. This could be used by other organizations to access your [place] medical records  [id number]        Your Vitals Were     Pulse Temperature Height Pulse Oximetry BMI (Body Mass Index)       86 97.9  F (36.6  C) (Oral) 5' 7.79\" (172.2 cm) 93% 38.55 kg/m2        Blood Pressure from Last 3 Encounters:   02/15/18 119/77   12/28/17 98/65   12/12/17 122/83    Weight from Last 3 Encounters:   02/15/18 252 lb (114.3 kg)   12/28/17 249 lb 12.8 oz (113.3 kg)   12/12/17 253 lb (114.8 kg)              We Performed the Following     OPHTHALMOLOGY ADULT REFERRAL          Today's Medication Changes          These changes are accurate as of 2/15/18 11:19 AM.  If you have any questions, ask your nurse or doctor.               Start taking these medicines.        Dose/Directions    ofloxacin 0.3 % otic solution   Commonly known as:  FLOXIN   Used for:  Chronic otitis media of right ear with perforated tympanic membrane   Started by:  Antelmo Harmon MD        Dose:  10 drop   Place 10 drops into the right ear 2 times daily for 7 days   Quantity:  10 mL   Refills:  0            Where to get your medicines      These medications were sent to [id number] - [place], [place] - [address] AT [facility] & [address]  [address], [place] [address]     Phone:  [phone]     ketoconazole 2 % shampoo    ofloxacin 0.3 % otic solution                Primary Care Provider Office Phone # Fax #    Shelly Gordon [phone] "       [address] [address]        Equal Access to Services     LENORE SMITH : Hadii aad ku hadrufinaphyllis Somykel, waaxda salima, qaybta kaalmascooter mac, pily mendez. So [facility] [phone].    ATENCIÓN: Si habla español, tiene a king disposición servicios gratuitos de asistencia lingüística. Llame al [phone].    We comply with applicable federal civil rights laws and [place] laws. We do not discriminate on the basis of race, color, national origin, age, disability, sex, sexual orientation, or gender identity.            Thank you!     Thank you for choosing [facility]  for your care. Our goal is always to provide you with excellent care. Hearing back from our patients is one way we can continue to improve our services. Please take a few minutes to complete the written survey that you may receive in the mail after your visit with us. Thank you!             Your Updated Medication List - Protect others around you: Learn how to safely use, store and throw away your medicines at www.disposemymeds.org.          This list is accurate as of 2/15/18 11:19 AM.  Always use your most recent med list.                   Brand Name Dispense Instructions for use Diagnosis    albuterol 108 (90 BASE) MCG/ACT Inhaler    PROAIR HFA    1 Inhaler    Inhale 2 puffs into the lungs every 6 hours as needed for shortness of breath / dyspnea or wheezing    Intermittent asthma, uncomplicated       fluticasone 44 MCG/ACT Inhaler    FLOVENT HFA    1 Inhaler    Inhale 2 puffs into the lungs 2 times daily    Mild persistent asthma without complication       ketoconazole 2 % shampoo    NIZORAL    120 mL    Apply topically daily    Tinea versicolor       ofloxacin 0.3 % otic solution    FLOXIN    10 mL    Place 10 drops into the right ear 2 times daily for 7 days    Chronic otitis media of right ear with perforated tympanic membrane          no

## 2021-05-07 ENCOUNTER — TELEPHONE (OUTPATIENT)
Dept: FAMILY MEDICINE | Facility: CLINIC | Age: 26
End: 2021-05-07

## 2021-05-07 NOTE — TELEPHONE ENCOUNTER
Patient Quality Outreach      Summary:    Patient has the following on his problem list/HM:   Asthma review       ACT Total Scores 9/6/2018   ACT TOTAL SCORE -   ASTHMA ER VISITS -   ASTHMA HOSPITALIZATIONS -   ACT TOTAL SCORE (Goal Greater than or Equal to 20) 10   In the past 12 months, how many times did you visit the emergency room for your asthma without being admitted to the hospital? 0   In the past 12 months, how many times were you hospitalized overnight because of your asthma? 0          Immunizations     No immunizations due        Patient is due/failing the following:   ACT needed and Asthma follow-up visit and Immunizations  Due for CPE and PCV vaccine, offer COVID as well.  Type of outreach:    Phone, spoke to patient/parent. visit on 6/7    Questions for provider review:    None                                                                                                                                     Shelly Gordon MD     .

## 2021-06-04 ENCOUNTER — OFFICE VISIT (OUTPATIENT)
Dept: FAMILY MEDICINE | Facility: CLINIC | Age: 26
End: 2021-06-04
Payer: COMMERCIAL

## 2021-06-04 VITALS
SYSTOLIC BLOOD PRESSURE: 115 MMHG | OXYGEN SATURATION: 98 % | RESPIRATION RATE: 16 BRPM | DIASTOLIC BLOOD PRESSURE: 73 MMHG | HEART RATE: 80 BPM | HEIGHT: 68 IN | WEIGHT: 256.8 LBS | BODY MASS INDEX: 38.92 KG/M2 | TEMPERATURE: 98.3 F

## 2021-06-04 DIAGNOSIS — Z11.3 SCREEN FOR STD (SEXUALLY TRANSMITTED DISEASE): Primary | ICD-10-CM

## 2021-06-04 DIAGNOSIS — H60.391 INFECTIVE OTITIS EXTERNA, RIGHT: ICD-10-CM

## 2021-06-04 PROCEDURE — 99213 OFFICE O/P EST LOW 20 MIN: CPT | Mod: GC | Performed by: STUDENT IN AN ORGANIZED HEALTH CARE EDUCATION/TRAINING PROGRAM

## 2021-06-04 RX ORDER — CIPROFLOXACIN AND DEXAMETHASONE 3; 1 MG/ML; MG/ML
4 SUSPENSION/ DROPS AURICULAR (OTIC) 2 TIMES DAILY
Qty: 7.5 ML | Refills: 0 | Status: SHIPPED | OUTPATIENT
Start: 2021-06-04 | End: 2021-06-14

## 2021-06-04 RX ORDER — FLUTICASONE PROPIONATE 50 MCG
1 SPRAY, SUSPENSION (ML) NASAL DAILY
Qty: 11.1 ML | Refills: 0 | Status: SHIPPED | OUTPATIENT
Start: 2021-06-04

## 2021-06-04 RX ORDER — PSEUDOEPHEDRINE HCL 30 MG
30 TABLET ORAL EVERY 4 HOURS PRN
Qty: 30 TABLET | Refills: 0 | Status: SHIPPED | OUTPATIENT
Start: 2021-06-04

## 2021-06-04 ASSESSMENT — MIFFLIN-ST. JEOR: SCORE: 2108.59

## 2021-06-04 NOTE — PROGRESS NOTES
"There are no exam notes on file for this visit.  Chief Complaint   Patient presents with     RECHECK     ck R ear pain and drainage/ It has been a year/ had a surgery on R ear two years ago per pt      other     want to test for STD per pt      Blood pressure 115/73, pulse 80, temperature 98.3  F (36.8  C), temperature source Oral, resp. rate 16, height 1.718 m (5' 7.64\"), weight 116.5 kg (256 lb 12.8 oz), SpO2 98 %.           NADIA Montero is a 26 year old  male with a PMH significant for:     Patient Active Problem List   Diagnosis     Tinea corporis     Class 2 obesity due to excess calories without serious comorbidity in adult     Strabismus     Allergic rhinitis     Unspecified mental or behavioral problem     Moderate persistent asthma without complication     Patient has a history of chronic otitis media previously treated, today he he presents with right ear discharge, fullness, foul odor, occasional bloody discharge.  He describes a foul odor is the most distressing issue at hand, this is accompanied with occasional milky discharge from the right ear.  Patient has been using a wax camera, Q-tips to clean out his ears regularly.  Wearing headphones, working out exacerbates the problem.  Describes decreased hearing on his right side compared to his left.  States that yesterday 6-3 he was seen at the dentist, was prescribed 8 days of 3 times a day 500 mg amoxicillin after root canal for cavity.  Denies any fevers chills, nausea, vomiting, weight changes, headaches, dizziness.  Denies any seasonal allergies, runny nose, sore throat.  No other acute complaints.    Secondly would like to be screened for STIs, is currently sexually active and has not been screened many years, however denies any penile discharge, itching, dysuria, urinary frequency.  Does have what he suspects are common warts on his fingers, wonders if he is still eligible for HPV vaccine which he has never received.    PMH, Medications " "and Allergies were reviewed and updated as needed.        REVIEW OF SYSTEMS     Constitutional, HEENT, cardiovascular, pulmonary, gi and gu systems are negative, except as otherwise noted.          OBJECTIVE     Vitals:    06/04/21 1345   BP: 115/73   BP Location: Left arm   Patient Position: Sitting   Cuff Size: Adult Small   Pulse: 80   Resp: 16   Temp: 98.3  F (36.8  C)   TempSrc: Oral   SpO2: 98%   Weight: 116.5 kg (256 lb 12.8 oz)   Height: 1.718 m (5' 7.64\")     Body mass index is 39.47 kg/m .    Constitutional: Awake, alert, cooperative, no acute distress, and appears stated age.  Eyes: sclera clear, conjunctiva normal.  ENT: Right external ear erythema, irritation with clear discharge, middle ear effusion milky white in appearance, left ear with tympanic membrane retraction, no effusion.  Neck: Supple, symmetrical, trachea midline  Back: Symmetric, no curvature  Lungs: No increased WOB, CTABL, no crackles or wheezing appreciated.  Cardiovascular: RRR, normal S1 and S2, no S3 or S4, and no murmur appreciated.  Abdomen: Normal BS, soft, non-distended, non-tender, no masses palpated  Musculoskeletal: No redness, warmth, or swelling of the joints. Tone is normal.  Neurologic: A&Ox3.  Cranial nerves II-XII are grossly intact.  Sensory is intact and gait is normal.  Neuropsychiatric: Normal affect, mood, orientation, memory and insight.  Skin: No visible rashes, erythema, pallor, petechia or purpura.    ACT Total Scores 12/12/2017 2/15/2018 9/6/2018   ACT TOTAL SCORE - - -   ASTHMA ER VISITS - - -   ASTHMA HOSPITALIZATIONS - - -   ACT TOTAL SCORE (Goal Greater than or Equal to 20) 18 21 10   In the past 12 months, how many times did you visit the emergency room for your asthma without being admitted to the hospital? 0 0 0   In the past 12 months, how many times were you hospitalized overnight because of your asthma? 0 0 0     No flowsheet data found.  No results found for any visits on 06/04/21.    ASSESSMENT AND " MARY Montero was seen today for recheck and other.    Diagnoses and all orders for this visit:    Screen for STD (sexually transmitted disease)  -     Cancel: Chlamydia trachomatis PCR  -     Cancel: Neisseria gonorrhoeae PCR  -     Chlamydia Trachomatis by PCR ([facility])  -     Neisseria Gonorrhoeae by PCR ([facility])    Infective otitis externa, right  -     ciprofloxacin-dexamethasone (CIPRODEX) 0.3-0.1 % otic suspension; Place 4 drops into the right ear 2 times daily for 10 days  -     fluticasone (FLONASE) 50 MCG/ACT nasal spray; Spray 1 spray into both nostrils daily  -     pseudoePHEDrine (SUDAFED) 30 MG tablet; Take 1 tablet (30 mg) by mouth every 4 hours as needed for congestion    On patient's right ear there appears to be both otitis media with effusion that is not perforated with coinciding otitis externa secondary to mechanical trauma.  Recommended continued the course of amoxicillin as prescribed for dentist procedure.  In addition use Ciprodex for otitis externa in the right ear for 10 days.  Left ear with tympanic membrane retraction secondary to eustachian tube dysfunction.  We will trial Flonase and Sudafed for suspected seasonal allergies.  Discussed referral with ENT, prior referral sent by Dr. Escoto patient will follow up in clinic on the 6/7/21 reexamine ears at the time consider follow-up with ENT.    Return in about 1 week (around 6/11/2021).    Salvador Holden MD  6/4/2021    Precepted with Dr. Kelly.

## 2021-06-04 NOTE — PROGRESS NOTES
Preceptor Attestation:    I discussed the patient with the resident and evaluated the patient in person. I have verified the content of the note, which accurately reflects my assessment of the patient and the plan of care.   Supervising Physician:  Isaías Gray MD.

## 2021-06-05 LAB
C TRACH DNA SPEC QL NAA+PROBE: NEGATIVE
N GONORRHOEA DNA SPEC QL NAA+PROBE: NEGATIVE
SPECIMEN SOURCE: NORMAL
SPECIMEN SOURCE: NORMAL

## 2021-07-19 ENCOUNTER — TELEPHONE (OUTPATIENT)
Dept: FAMILY MEDICINE | Facility: CLINIC | Age: 26
End: 2021-07-19

## 2021-09-24 ENCOUNTER — TELEPHONE (OUTPATIENT)
Dept: FAMILY MEDICINE | Facility: CLINIC | Age: 26
End: 2021-09-24

## 2021-09-25 ENCOUNTER — HEALTH MAINTENANCE LETTER (OUTPATIENT)
Age: 26
End: 2021-09-25

## 2021-10-11 ENCOUNTER — TELEPHONE (OUTPATIENT)
Dept: FAMILY MEDICINE | Facility: CLINIC | Age: 26
End: 2021-10-11

## 2022-01-15 ENCOUNTER — HEALTH MAINTENANCE LETTER (OUTPATIENT)
Age: 27
End: 2022-01-15

## 2022-03-23 ENCOUNTER — OFFICE VISIT (OUTPATIENT)
Dept: FAMILY MEDICINE | Facility: CLINIC | Age: 27
End: 2022-03-23
Payer: COMMERCIAL

## 2022-03-23 VITALS
RESPIRATION RATE: 14 BRPM | WEIGHT: 282.6 LBS | HEART RATE: 101 BPM | DIASTOLIC BLOOD PRESSURE: 84 MMHG | TEMPERATURE: 98.2 F | OXYGEN SATURATION: 98 % | BODY MASS INDEX: 43.43 KG/M2 | SYSTOLIC BLOOD PRESSURE: 127 MMHG

## 2022-03-23 DIAGNOSIS — H66.91 RIGHT OTITIS MEDIA, UNSPECIFIED OTITIS MEDIA TYPE: ICD-10-CM

## 2022-03-23 DIAGNOSIS — Z76.0 ENCOUNTER FOR MEDICATION REFILL: ICD-10-CM

## 2022-03-23 DIAGNOSIS — B35.4 TINEA CORPORIS: Primary | ICD-10-CM

## 2022-03-23 DIAGNOSIS — E66.01 MORBID OBESITY (H): ICD-10-CM

## 2022-03-23 DIAGNOSIS — J45.40 MODERATE PERSISTENT ASTHMA WITHOUT COMPLICATION: ICD-10-CM

## 2022-03-23 PROCEDURE — 99214 OFFICE O/P EST MOD 30 MIN: CPT | Mod: GC

## 2022-03-23 RX ORDER — ALBUTEROL SULFATE 90 UG/1
2 AEROSOL, METERED RESPIRATORY (INHALATION) EVERY 4 HOURS PRN
Qty: 6.7 G | Refills: 3 | Status: SHIPPED | OUTPATIENT
Start: 2022-03-23

## 2022-03-23 RX ORDER — BUDESONIDE AND FORMOTEROL FUMARATE DIHYDRATE 160; 4.5 UG/1; UG/1
2 AEROSOL RESPIRATORY (INHALATION) 2 TIMES DAILY
Qty: 6 G | Refills: 3 | Status: SHIPPED | OUTPATIENT
Start: 2022-03-23

## 2022-03-23 RX ORDER — FLUCONAZOLE 200 MG/1
200 TABLET ORAL WEEKLY
Qty: 12 TABLET | Refills: 0 | Status: SHIPPED | OUTPATIENT
Start: 2022-03-23 | End: 2022-06-09

## 2022-03-23 ASSESSMENT — ANXIETY QUESTIONNAIRES
3. WORRYING TOO MUCH ABOUT DIFFERENT THINGS: MORE THAN HALF THE DAYS
6. BECOMING EASILY ANNOYED OR IRRITABLE: SEVERAL DAYS
2. NOT BEING ABLE TO STOP OR CONTROL WORRYING: MORE THAN HALF THE DAYS
5. BEING SO RESTLESS THAT IT IS HARD TO SIT STILL: SEVERAL DAYS
GAD7 TOTAL SCORE: 13
7. FEELING AFRAID AS IF SOMETHING AWFUL MIGHT HAPPEN: MORE THAN HALF THE DAYS
1. FEELING NERVOUS, ANXIOUS, OR ON EDGE: NEARLY EVERY DAY

## 2022-03-23 ASSESSMENT — ASTHMA QUESTIONNAIRES: ACT_TOTALSCORE: 12

## 2022-03-23 ASSESSMENT — PATIENT HEALTH QUESTIONNAIRE - PHQ9
SUM OF ALL RESPONSES TO PHQ QUESTIONS 1-9: 18
5. POOR APPETITE OR OVEREATING: MORE THAN HALF THE DAYS

## 2022-03-23 NOTE — PROGRESS NOTES
Preceptor Attestation:    I discussed the patient with the resident and evaluated the patient in person. I have verified the content of the note, which accurately reflects my assessment of the patient and the plan of care.   Supervising Physician:  João Clark MD.

## 2022-03-23 NOTE — PATIENT INSTRUCTIONS
Thank you for trusting us with your care.     Here's a summary of the visit today:   1. I have sent antifungal medication to pharmacy for body ringworm.  2. Refill also sent for asthma meds   3. I have put in a referral to ENT for you to have your ear looked at. Please apply the ear drops in the meantime,   4. Return to clinic if symptoms worsen or don't get better       Thank you.     Dr. Lopez

## 2022-03-23 NOTE — PROGRESS NOTES
"[facility] Medicine Clinic Visit    Assessment & Plan        Assessment & Plan     Winston was seen today for derm problem and ear problem.    Diagnoses and all orders for this visit:    Tinea corporis  History of tinea corporis lesions treated with antifungal in 2017 and 2019. Patient noticed recurrence this past week which he attributes to increased physcial exercise and sweating. Reports he has noticed worsening due to \"scrubbing\" in the shower. Says 12 doses of fluconazole cleared up lesions in the past. VSS. Physical exam notable for multiple raised, ring-like scaly lesions scattered asymmetrically on anterior chest and abdominal wall. Will treat with fluconazole once weekly for 12 weeks. Follow up in 3 months to reassess if lesions not resolved. Discussed general hygiene after working out and replacing body scrubber after use.   -     fluconazole (DIFLUCAN) 200 MG tablet; Take 1 tablet (200 mg) by mouth once a week for 12 doses    Right otitis media, unspecified otitis media type  R external ear canal opacification/deposit obstructing view of right TM. Left TM clear with mild cerumen build up. Given history of chronic ear infections in the right ear, will refer to ENT for further eval and treatment.   -     Otolaryngology Referral; Future    Moderate persistent asthma without complication  Encounter for medication refill  -     albuterol (PROAIR HFA/PROVENTIL HFA/VENTOLIN HFA) 108 (90 Base) MCG/ACT inhaler; Inhale 2 puffs into the lungs every 4 hours as needed for shortness of breath / dyspnea or wheezing  -     budesonide-formoterol (SYMBICORT) 160-4.5 MCG/ACT Inhaler; Inhale 2 puffs into the lungs 2 times daily      Options for treatment and follow-up care were reviewed with the patient who was engaged and actively involved in the decision making process, verbalized understanding of the options discussed, and satisfied with the final plan.    Patient was staffed with supervising physician, Dr. Clark. " "      Madeline Lopez DO, PGY1  [facility]    Today I precepted with Dr. Amber MD, who agrees with the assessment and plan.      Subjective   Winston Vogt is a 26 year old male with a history including asthma and moderate obesity  who presents for follow up.     Patient has a number of concerns today. Reports he is here to have skin issue looked at primary but also would like to have his ear checked out.     He Would like to focus on the \"tinea versicolor\"todat. Noticed spots under beard and on the front chest last week that had initially resolved with antifungal medication. No associated redness, swelling, discharge, but there is itching and flaking.     Patient reports he has been more stressed out due to recent court issues and so has used exercise as an outlet for stress management. He reports seeing a therapist on a regular basis.      Social: He reports that he has never smoked. He has never used smokeless tobacco. He reports that he does not drink alcohol and does not use drugs.      Objective     Vitals:    03/23/22 1606   BP: 127/84   Pulse: 101   Resp: 14   Temp: 98.2  F (36.8  C)   SpO2: 98%   Weight: 128.2 kg (282 lb 9.6 oz)     Body mass index is 43.43 kg/m .    GEN: NAD, healthy, alert  Constitutional: Awake, alert, cooperative, no acute distress, and appears stated age. Moderately obese  HEENT: NC/AT, EOMI, normal conjunctivae/sclerae, clear oropharynx, MMM  Eyes: EOMI, sclera clear, conjunctiva normal.  ENT: left TM clear, right TM view obstructed, opacified deposit in external ear canal but no effusion appreciated . Tonsils nonerythematous and without exudates or trismus.  Neck: Supple, symmetrical, trachea midline. No submandibular LAD.  Back: Symmetric, no curvature  Lungs: No increased WOB. CTABL, no crackles or wheezing appreciated.  Cardiovascular: Appears well perfused. RRR, normal S1 and S2, no S3 or S4, and no murmur appreciated.  Abdomen: Normal BS, soft, " non-distended, non-tender, no masses palpated  Musculoskeletal: No redness, warmth, or swelling of the joints. Tone is normal.  Neurologic: A&Ox3.  Cranial nerves II-XII are grossly intact.  Sensory is intact and gait is normal.  Neuropsychiatric: Normal affect, mood, orientation, memory and insight.  Skin: mutliple raised, ring-like scaly lesions scattered asymmetrically on anterior chest and abdominal wall.

## 2022-03-24 ASSESSMENT — ANXIETY QUESTIONNAIRES: GAD7 TOTAL SCORE: 13

## 2022-03-26 PROBLEM — E66.01 MORBID OBESITY (H): Status: ACTIVE | Noted: 2022-03-26

## 2022-06-24 DIAGNOSIS — H66.90 OTITIS MEDIA: Primary | ICD-10-CM

## 2022-12-26 ENCOUNTER — HEALTH MAINTENANCE LETTER (OUTPATIENT)
Age: 27
End: 2022-12-26

## 2023-04-16 ENCOUNTER — HEALTH MAINTENANCE LETTER (OUTPATIENT)
Age: 28
End: 2023-04-16

## 2024-06-23 ENCOUNTER — HEALTH MAINTENANCE LETTER (OUTPATIENT)
Age: 29
End: 2024-06-23